# Patient Record
Sex: MALE | Race: WHITE | URBAN - METROPOLITAN AREA
[De-identification: names, ages, dates, MRNs, and addresses within clinical notes are randomized per-mention and may not be internally consistent; named-entity substitution may affect disease eponyms.]

---

## 2017-01-05 ENCOUNTER — VIRTUAL VISIT (OUTPATIENT)
Dept: NURSING | Facility: CLINIC | Age: 57
End: 2017-01-05

## 2017-01-05 DIAGNOSIS — E66.01 MORBID OBESITY DUE TO EXCESS CALORIES (H): ICD-10-CM

## 2017-01-05 DIAGNOSIS — E11.59 TYPE 2 DIABETES MELLITUS WITH OTHER CIRCULATORY COMPLICATIONS (H): Primary | ICD-10-CM

## 2017-01-05 PROCEDURE — 99207 ZZC HEALTH COACHING, NO CHARGE: CPT

## 2017-01-05 NOTE — PROGRESS NOTES
January 5, 2017    [facility]  [address] [facility] [address]  [phone]  Health Coaching Progress Note    Patient Name: Hill Ardon Date: January 5, 2017      Session Length: 20      DATA    PRM Master Survey Scores Reviewed: Yes    Core Healthy Days Survey:         JEFE Score (Last Two) 7/14/2016 7/25/2016   JEFE Raw Score 41 38   Activation Score 63.2 52.9   JEFE Level 3 2       PHQ-2 Score 8/24/2016 8/9/2016   PHQ-2 Total Score Interpretation - Positive if 3 or more points; Administer PHQ-9 if positive 0 0       No flowsheet data found.    Treatment Objective(s) Addressed in This Session:  Target Behavior(s): diet/weight loss and smoking    Current Stressors / Issues:  Geoffrey states that he is freezing as he recently returned from [place]. Geoffrey goes on to say he had some things he had to come back a bit early to handle but that things are going okay now, not great but okay.    What Patient Does Well:   Geoffrey shares today that he is still smoke free and doesn't even think seriously about going back. First and foremost it is completely unaffordable in [place]. Geoffrey shares that it also helps a lot that his girlfriend also quit smoking about 2 weeks after he did.    Previous Successes:   Geoffrey states that he has greatly reduced his sweets intake and expects his A1c to reflect this. Geoffrey states that he may come into clinic to do his A1c tomorrow, if not it will likely be in the next week.    Areas in Need of Improvement:   Geoffrey reports no additional areas in need of improvement that he isn't already working on.     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 Geoffrey notes that he is taking his insulin and his HBP medication regularly though.    Reasons for Change:   Geoffrey shares that his health is important to him and  that he wants to be able to work and also enjoy life.    Plan/Goal for the Next 4 Weeks:     GOAL #1: Continue tobacco cessation  GOAL #1 Progress Toward Goal: 100%  GOAL #2: Continue walking every other day  GOAL #2 Progress Toward Goal: 100%  GOAL #3: Continue decreasing sweets  GOAL #3 Progress Toward Goal: 100%    Intervention:  Motivational Interviewing    MI Intervention: Expressed Empathy/Understanding, Supported Autonomy, Collaboration, Evocation, Permission to raise concern or advise, Open-ended questions, Reflections: simple and complex, Change talk (evoked) and Reframe     Change Talk Expressed by the Patient: Desire to change Ability to change Reasons to change Committment to change Activation Taking steps    Provider Response to Change Talk: E - Evoked more info from patient about behavior change, A - Affirmed patient's thoughts, decisions, or attempts at behavior change, R - Reflected patient's change talk and S - Summarized patient's change talk statements    Assessment / Progress on Treatment Objective(s) / Homework:    Satisfactory progress - ACTION (Actively working towards change); Intervened by reinforcing change plan / affirming steps taken         Plan: (Homework, other):  Patient was encouraged to continue to seek condition-related information and education, as well as schedule a follow up appointment with the Health  in 4 weeks. Patient has set self-identified goals and will monitor progress until the next appointment.  Next visit scheduled for February 2 at 11:30AM.      Sam Elam

## 2017-01-25 ENCOUNTER — ALLIED HEALTH/NURSE VISIT (OUTPATIENT)
Dept: PHARMACY | Facility: CLINIC | Age: 57
End: 2017-01-25
Payer: COMMERCIAL

## 2017-01-25 ENCOUNTER — TELEPHONE (OUTPATIENT)
Dept: FAMILY MEDICINE | Facility: CLINIC | Age: 57
End: 2017-01-25

## 2017-01-25 DIAGNOSIS — I25.10 CORONARY ARTERY DISEASE INVOLVING NATIVE HEART WITHOUT ANGINA PECTORIS, UNSPECIFIED VESSEL OR LESION TYPE: ICD-10-CM

## 2017-01-25 DIAGNOSIS — K21.00 GASTROESOPHAGEAL REFLUX DISEASE WITH ESOPHAGITIS: ICD-10-CM

## 2017-01-25 DIAGNOSIS — I10 ESSENTIAL HYPERTENSION WITH GOAL BLOOD PRESSURE LESS THAN 140/90: ICD-10-CM

## 2017-01-25 DIAGNOSIS — B18.2 CHRONIC HEPATITIS C WITHOUT HEPATIC COMA (H): ICD-10-CM

## 2017-01-25 DIAGNOSIS — E11.59 TYPE 2 DIABETES MELLITUS WITH OTHER CIRCULATORY COMPLICATIONS (H): ICD-10-CM

## 2017-01-25 DIAGNOSIS — E78.5 HYPERLIPIDEMIA LDL GOAL <100: ICD-10-CM

## 2017-01-25 DIAGNOSIS — E11.59 TYPE 2 DIABETES MELLITUS WITH OTHER CIRCULATORY COMPLICATIONS (H): Primary | ICD-10-CM

## 2017-01-25 DIAGNOSIS — G47.00 INSOMNIA, UNSPECIFIED TYPE: ICD-10-CM

## 2017-01-25 DIAGNOSIS — F17.200 CURRENT SMOKER: Primary | ICD-10-CM

## 2017-01-25 PROCEDURE — 99607 MTMS BY PHARM ADDL 15 MIN: CPT | Performed by: PHARMACIST

## 2017-01-25 PROCEDURE — 99605 MTMS BY PHARM NP 15 MIN: CPT | Performed by: PHARMACIST

## 2017-01-25 NOTE — Clinical Note
Dr. Ding,  I saw your patient for a med review before his Hep C therapy start. Pt has been unable to afford his medications until recently, he stopped many of them, but should be restarting them today. I will follow up with him in 2 weeks to make sure he is doing okay.   See note for details  Jose Healy, PharmD [facility] Pharmacist  Phone: [phone]

## 2017-01-25 NOTE — PROGRESS NOTES
SUBJECTIVE/OBJECTIVE:                                                    Hill Ardon is a 56 year old male called for an initial visit for Medication Therapy Management.  He was referred to me from MARY Witt.     Chief Complaint: He is concerned he is taking Omeprazole.     Allergies/ADRs: None  Tobacco: 0-1 pack per day - is interested in quitting Tobacco Cessation Action Plan: Pharmacotherapies : other Nicotine replacement and cold turkey  Alcohol: history of alcohol dependence  Caffeine: 8-10 cups/day of coffee  Activity: not much  PMH: Reviewed in Epic    Medication Adherence: Pt has not been able to afford medications. He hit his out of pocket deductible with Harvoni He has ordered the rest of his Medications.  He is currently taking: Aspirin 81mg daily, Omeprazole 40mg daily, Atorvastatin, Novolog,Duoneb, Lisinopril  Ordered: Lantus, Clopidogrel, Carvedilol.    HCV:   -[facility] Use Only -   Genotype: 1a  Viral Load and date drawn: 18 million on 7/15/16  Previous treatment: Treatment naive  Liver staging: F1-2 on 9/26/16  Child Mosher score: N/A  HIV positive: No  Renal impairment CrCl <30mL/min: No  Decompensated Cirrhosis: No  Recurrent HCV post-liver transplant: No  Hepatocellular Carcinoma: No   Initial Regimen: Harvoni (ledipasvir/sofosbuvir)  Length of Therapy Ordered: 12 weeks  DDIx with HCV therapy: Omeprazole, Atorvastatin, Carvedilol.  MTM Evaluation: HCV therapy appropriate and Length of therapy appropriate    GERD: Current medications include: Prilosec (omeprazole) 40mg daily. Pt c/o heartburn after eating spicy and acidic foods.  Patient feels that current regimen is effective. He has taken a week off PPI before without issue, if he avoids certain foods.     Diabetes:  Pt currently taking Novolog 14 units only twice daily, has not been taking his Lantus due to cost, but now can afford it due to meeting his deductable. He is taking Lantus 30 units qHS when he has some.  Denies Side effects  SMBG: three times daily.   Ranges (patient reported): States he is running around <200.   Symptoms of low blood sugar? none. Frequency of hypoglycemia? never.  Recent symptoms of high blood sugar? polydipsia and polyuria  Aspirin: Taking 81mg daily and denies side effects  Diet/Exercise: pt states he was doing great on diabetic diet, but has fallen off the wagon so to speak.     Hx of Stent in 2015: Pt is taking Aspirin 81mg daily,he has not been taking Clopidogrel. His cardiologist is at [facility], Dr. Rojas. Denies issues    Hypertension: He is not taking Carvedilol 6.25mg BID, but has ordered it today. He is taking Lisinopril 40mg daily. Patient does not self-monitor BP, last in clinic was 112/77.  Patient reports no current medication side effects.    Insomnia: Current medications include: trazodone 100mg qHS. Pt reports not taking due to cost, but has ordered since he met his deductible.    Smoking Cessation:  How much does he smoke:  10 cigarettes. He recently quit for 6 weeks, but started after dropping to 14mg.  His girlfriend quit smoking, which motivated him to quit as well. He is interested in setting another quit date. He thinks he would do better quitting cold turkey.      Hyperlipidemia: Current therapy includes Atorvastatin 40mg daily, Fenofibrate 160mg daily.  Pt reports no significant myalgias or other side effects.   The 10-year ASCVD risk score (Anne DESIREE Jr., et al., 2013) is: 18%    Values used to calculate the score:      Age: 56 years      Sex: Male      Is an : No      Diabetic: Yes      Tobacco smoker: No      Systolic Blood Pressure: 116 mmHg      Prescribed Antihypertensives: Yes      HDL Cholesterol: 42 mg/dL      Total Cholesterol: 239 mg/dL    Current labs include:  BP Readings from Last 3 Encounters:   10/19/16 116/63   09/07/16 146/95   08/24/16 112/69     Today's Vitals: There were no vitals taken for this visit.  A1C      9.9   8/24/2016.  CHOL       239   7/15/2016  TRIG      341   7/15/2016  HDL       42   7/15/2016  LDL      129   7/15/2016    Liver Function Studies -   Recent Labs   Lab Test  08/24/16   0923   PROTTOTAL  7.4   ALBUMIN  3.6   BILITOTAL  0.4   ALKPHOS  65   AST  42   ALT  93*       Lab Results   Component Value Date    UCRR 76 07/14/2016    MICROL 8 07/14/2016    UMALCR 10.58 07/14/2016       Last Basic Metabolic Panel:  NA      134   7/15/2016   POTASSIUM      3.9   7/15/2016  CHLORIDE       97   7/15/2016  BUN       16   7/15/2016  CR     0.98   7/15/2016  GFR ESTIMATE   Date Value Ref Range Status   07/15/2016 79 >60 mL/min/1.7m2 Final     Comment:     Non  GFR Calc       TSH   Date Value Ref Range Status   07/15/2016 3.22 0.40 - 4.00 mU/L Final   ]    Most Recent Immunizations   Administered Date(s) Administered     Pneumococcal 23 valent 06/30/2015     TD (ADULT, 7+) 04/22/2007     ASSESSMENT:                                                       Current medications were reviewed today.     Medication Adherence: Needs improvement. Pt has recently met deductible and will be restarting his medications soon. I recommended he start ASAP. He was unable to afford many of them before.     HCV: Therapy is appropriate. Harvoni daily in the AM for 12 weeks.   DDIx:   -Omeprazole- Pt agreed to D/C Omeprazole before starting Harvoni, he has gone off of it before without issue. He is to call me if he has any rebound heartburn.   -Atorvastatin- Moderate interaction, Harvoni may increase Atorvastatin levels, pt to monitor for myalgias.   -Carvedilol- Moderate interaction. Pt to monitor for exercise fatigue, weakness, dizziness. Harvoni may increase Carvedilol levels.     GERD: Needs improvement. Pt to hold Omeprazole while on Harvoni. We discussed the negative long term effects of PPI treatment, and patient may want to manage heartburn using other methods. Pt's Mother has hx of osteoporosis, and chronic PPI use can increase fracture  risk.     Diabetes: Needs improvement. Pt's BG is not at goal. Last A1c was 9.9%. Pt will be restarting Lantus ASAP. I recommend patient record his BG values in a journal. We will discuss at next visit.     Hx of Stent in 2015: Needs improvement. Pt is 2 years post stent, he is not indicated for continuing Clopidogrel use. We will discuss this at next visit.     Hypertension: Stable. Last BP at goal, pt restarting Carvedilol, see HCV assessment for information on DDIx with Harvoni, not major.     Insomnia: Stable.     Smoking Cessation: Needs improvement. Pt set quit date for 2/4, will discuss at his FU on 2/8. He wanted to try cold turkey, but I encouraged him to use Nicotine gum q 1-2 hours as needed for cravings as well.      Hyperlipidemia: Stable. Pt to monitor for increased myalgias while on Harvoni.     PLAN:                                                      Pt to...  1. Your quit date is set for 2/4. Use your Nicotine gum every 1-2 hours as needed for cravings during this time. The night before remember to throw out any smoking paraphernalia     2. Hold Omeprazole while on Harvoni. Sometimes quitting this medication cold turkey can cause rebound heartburn. If you have this issue, please let me know and we will work out a plan to control your heartburn. For now avoid your food triggers.     3. Monitor for thigh muscle weakness or achiness. This could be caused by a drug interaction between Atorvastatin and Harvoni. Let me know if this occurs.     4. Monitor for dizziness, lightheadedness, or excess fatigue. This could be caused by a drug interaction between Carvedilol and Harvoni. Let me know if this occurs.     5. Record your blood sugars and blood pressure in a journal between now and 2/8, our next visit. We will go over these specific numbers at our next visit.    6.  you medications from the pharmacy ASAP!    I spent 45 minutes with this patient today.  All changes were made via collaborative  practice agreement with Fariba Ding. A copy of the visit note was provided to the patient's primary care provider.    Will follow up in 2 weeks.    The patient was sent via [employer] a summary of these recommendations as an after visit summary.     Jose Healy PharmD  [facility] Pharmacist    Phone: [phone]

## 2017-01-25 NOTE — TELEPHONE ENCOUNTER
carvedilol (COREG) 6.25 MG tablet      Last Written Prescription Date: 7/14/16  Last Fill Quantity: 180, # refills: 0    Last Office Visit with [facility], [facility] or [facility] prescribing provider:  8/24/16   Future Office Visit:    Next 5 appointments (look out 90 days)     Feb 02, 2017 11:30 AM   [facility] [address] [facility] [facility] [address] [phone]                    BP Readings from Last 3 Encounters:   10/19/16 116/63   09/07/16 146/95   08/24/16 112/69     insulin glargine (LANTUS SOLOSTAR) 100 UNIT/ML PEN         Last Written Prescription Date: 7/14/16  Last Fill Quantity: 3, # refills: 0  Last Office Visit with [facility], [facility] or  Health prescribing provider:  8/24/16   Next 5 appointments (look out 90 days)     Feb 02, 2017 11:30 AM   Telephone Visit with [address] [address] [phone]                   BP Readings from Last 3 Encounters:   10/19/16 116/63   09/07/16 146/95   08/24/16 112/69     MICROL        8   7/14/2016  No results found for this basename: microalbumin  CREATININE   Date Value Ref Range Status   07/15/2016 0.98 0.66 - 1.25 mg/dL Final   ]  GFR ESTIMATE   Date Value Ref Range Status   07/15/2016 79 >60 mL/min/1.7m2 Final     Comment:     Non  GFR Calc     GFR ESTIMATE IF BLACK   Date Value Ref Range Status   07/15/2016 >90   GFR Calc   >60 mL/min/1.7m2 Final     CHOL      239   7/15/2016  HDL       42   7/15/2016  LDL      129   7/15/2016  TRIG      341   7/15/2016  No results found for this basename: cholhdlratio  AST       42   8/24/2016  ALT       93   8/24/2016  A1C      9.9   8/24/2016  A1C      9.9   7/15/2016  POTASSIUM   Date Value Ref Range Status   07/15/2016 3.9 3.4 - 5.3 mmol/L Final       lisinopril (PRINIVIL,ZESTRIL) 40 MG tablet     Last Written  Prescription Date: 7/14/16  Last Fill Quantity: 90, # refills: 0  Last Office Visit with [facility], [facility] or [facility] prescribing provider: 8/24/16   Next 5 appointments (look out 90 days)     Feb 02, 2017 11:30 AM   Telephone Visit with [address] ([facility])    [address] [facility] [address] [facility]  [place] [address]   [phone]                   POTASSIUM   Date Value Ref Range Status   07/15/2016 3.9 3.4 - 5.3 mmol/L Final     CREATININE   Date Value Ref Range Status   07/15/2016 0.98 0.66 - 1.25 mg/dL Final     BP Readings from Last 3 Encounters:   10/19/16 116/63   09/07/16 146/95   08/24/16 112/69     NOVOLOG FLEXPEN 100 UNIT/ML soln        Last Written Prescription Date: 10/1/16  Last Fill Quantity: 15mL, # refills: 0  Last Office Visit with [facility], [facility] or [facility] prescribing provider:  8/24/16   Next 5 appointments (look out 90 days)     Feb 02, 2017 11:30 AM   Telephone Visit with [address] ([facility])    [address] [facility] [address] [phone]                   BP Readings from Last 3 Encounters:   10/19/16 116/63   09/07/16 146/95   08/24/16 112/69     MICROL        8   7/14/2016  No results found for this basename: microalbumin  CREATININE   Date Value Ref Range Status   07/15/2016 0.98 0.66 - 1.25 mg/dL Final   ]  GFR ESTIMATE   Date Value Ref Range Status   07/15/2016 79 >60 mL/min/1.7m2 Final     Comment:     Non  GFR Calc     GFR ESTIMATE IF BLACK   Date Value Ref Range Status   07/15/2016 >90   GFR Calc   >60 mL/min/1.7m2 Final     CHOL      239   7/15/2016  HDL       42   7/15/2016  LDL      129   7/15/2016  TRIG      341   7/15/2016  No results found for this basename: cholhdlratio  AST       42   8/24/2016  ALT       93   8/24/2016  A1C      9.9   8/24/2016  A1C      9.9   7/15/2016  POTASSIUM   Date Value Ref Range Status   07/15/2016 3.9 3.4 - 5.3  mmol/L Final     atorvastatin (LIPITOR) 40 MG tablet     Last Written Prescription Date: 7/14/16  Last Fill Quantity: 30, # refills: 1  Last Office Visit with [facility], [facility] or  [facility] prescribing provider: 8/24/16   Next 5 appointments (look out 90 days)     Feb 02, 2017 11:30 AM   Telephone Visit with HEALTH  - [facility]   [facility] ([facility]    [address] [facility] [address]   [phone]                   CHOL      239   7/15/2016  HDL       42   7/15/2016  LDL      129   7/15/2016  TRIG      341   7/15/2016  No results found for this basename: cholhdlratio

## 2017-01-27 NOTE — TELEPHONE ENCOUNTER
Routing refill request to provider for review/approval because:  Marisol given x1 and patient did not follow up, please advise  Overdue for f/u    Pete Joyce RN

## 2017-01-27 NOTE — TELEPHONE ENCOUNTER
TC - please help with scheduling and route it back to the RN's pool.    Valente SELLERS, RN, BSN

## 2017-01-30 ENCOUNTER — TELEPHONE (OUTPATIENT)
Dept: FAMILY MEDICINE | Facility: CLINIC | Age: 57
End: 2017-01-30

## 2017-01-30 DIAGNOSIS — Z79.4 TYPE 2 DIABETES MELLITUS WITH HYPERGLYCEMIA, WITH LONG-TERM CURRENT USE OF INSULIN (H): Primary | ICD-10-CM

## 2017-01-30 DIAGNOSIS — E11.65 TYPE 2 DIABETES MELLITUS WITH HYPERGLYCEMIA, WITH LONG-TERM CURRENT USE OF INSULIN (H): Primary | ICD-10-CM

## 2017-01-30 RX ORDER — CARVEDILOL 6.25 MG/1
TABLET ORAL
Qty: 180 TABLET | Refills: 0 | Status: SHIPPED | OUTPATIENT
Start: 2017-01-30 | End: 2017-01-31

## 2017-01-30 RX ORDER — INSULIN ASPART 100 [IU]/ML
INJECTION, SOLUTION INTRAVENOUS; SUBCUTANEOUS
Qty: 15 ML | Refills: 0 | Status: SHIPPED | OUTPATIENT
Start: 2017-01-30 | End: 2017-01-31

## 2017-01-30 RX ORDER — INSULIN GLARGINE 100 [IU]/ML
INJECTION, SOLUTION SUBCUTANEOUS
Qty: 10 ML | Refills: 0 | Status: SHIPPED | OUTPATIENT
Start: 2017-01-30 | End: 2017-01-31

## 2017-01-30 RX ORDER — ATORVASTATIN CALCIUM 40 MG/1
TABLET, FILM COATED ORAL
Qty: 90 TABLET | Refills: 0 | Status: SHIPPED | OUTPATIENT
Start: 2017-01-30 | End: 2017-01-31

## 2017-01-30 RX ORDER — LISINOPRIL 40 MG/1
TABLET ORAL
Qty: 90 TABLET | Refills: 0 | Status: SHIPPED | OUTPATIENT
Start: 2017-01-30 | End: 2017-01-31

## 2017-01-30 NOTE — TELEPHONE ENCOUNTER
Pt is currently taking Harvoni, this can increase the levels of carvedilol and atorvastatin. I did tell him to restart both medications, he has been off his medications for some time due to finances. Therapy should be monitored. He has an appt. 1-31-17  Thank you  Tiffanie De Leon, [facility]  Phone [phone]  Fax      [phone]

## 2017-01-30 NOTE — TELEPHONE ENCOUNTER
Next 5 appointments (look out 90 days)     Jan 31, 2017  2:20 PM   [facility]iba [facility]ing MD   [facility]    [address]  [place] [address]   [phone]            Feb 02, 2017 11:30 AM   Telephone Visit with HEALTH  - [facility] 2   [facility]    [address]  [place] [address]   [phone]              Cristina Black,     Called Patient is scheduled to be seen and is out of medications.

## 2017-01-31 ENCOUNTER — OFFICE VISIT (OUTPATIENT)
Dept: FAMILY MEDICINE | Facility: CLINIC | Age: 57
End: 2017-01-31
Payer: COMMERCIAL

## 2017-01-31 VITALS
SYSTOLIC BLOOD PRESSURE: 104 MMHG | BODY MASS INDEX: 36.5 KG/M2 | WEIGHT: 240 LBS | TEMPERATURE: 97.9 F | HEART RATE: 94 BPM | OXYGEN SATURATION: 95 % | DIASTOLIC BLOOD PRESSURE: 76 MMHG | RESPIRATION RATE: 14 BRPM

## 2017-01-31 DIAGNOSIS — Z23 ENCOUNTER FOR IMMUNIZATION: ICD-10-CM

## 2017-01-31 DIAGNOSIS — E11.42 DIABETIC POLYNEUROPATHY ASSOCIATED WITH TYPE 2 DIABETES MELLITUS (H): ICD-10-CM

## 2017-01-31 DIAGNOSIS — Z79.4 TYPE 2 DIABETES MELLITUS WITH OTHER CIRCULATORY COMPLICATION, WITH LONG-TERM CURRENT USE OF INSULIN (H): Primary | ICD-10-CM

## 2017-01-31 DIAGNOSIS — I25.10 CORONARY ARTERY DISEASE INVOLVING NATIVE HEART WITHOUT ANGINA PECTORIS, UNSPECIFIED VESSEL OR LESION TYPE: ICD-10-CM

## 2017-01-31 DIAGNOSIS — E78.5 HYPERLIPIDEMIA LDL GOAL <100: ICD-10-CM

## 2017-01-31 DIAGNOSIS — B18.2 CHRONIC HEPATITIS C WITHOUT HEPATIC COMA (H): ICD-10-CM

## 2017-01-31 DIAGNOSIS — I42.9 CARDIOMYOPATHY (H): ICD-10-CM

## 2017-01-31 DIAGNOSIS — I10 ESSENTIAL HYPERTENSION WITH GOAL BLOOD PRESSURE LESS THAN 140/90: ICD-10-CM

## 2017-01-31 DIAGNOSIS — E66.01 MORBID OBESITY DUE TO EXCESS CALORIES (H): ICD-10-CM

## 2017-01-31 DIAGNOSIS — E11.59 TYPE 2 DIABETES MELLITUS WITH OTHER CIRCULATORY COMPLICATION, WITH LONG-TERM CURRENT USE OF INSULIN (H): Primary | ICD-10-CM

## 2017-01-31 DIAGNOSIS — Z23 NEED FOR PROPHYLACTIC VACCINATION AND INOCULATION AGAINST INFLUENZA: ICD-10-CM

## 2017-01-31 LAB — HBA1C MFR BLD: 11.8 % (ref 4.3–6)

## 2017-01-31 PROCEDURE — 99214 OFFICE O/P EST MOD 30 MIN: CPT | Mod: 25 | Performed by: FAMILY MEDICINE

## 2017-01-31 PROCEDURE — 90715 TDAP VACCINE 7 YRS/> IM: CPT | Performed by: FAMILY MEDICINE

## 2017-01-31 PROCEDURE — G0008 ADMIN INFLUENZA VIRUS VAC: HCPCS | Mod: 59 | Performed by: FAMILY MEDICINE

## 2017-01-31 PROCEDURE — 90471 IMMUNIZATION ADMIN: CPT | Mod: 59 | Performed by: FAMILY MEDICINE

## 2017-01-31 PROCEDURE — 80061 LIPID PANEL: CPT | Performed by: FAMILY MEDICINE

## 2017-01-31 PROCEDURE — 80076 HEPATIC FUNCTION PANEL: CPT | Performed by: FAMILY MEDICINE

## 2017-01-31 PROCEDURE — 80048 BASIC METABOLIC PNL TOTAL CA: CPT | Performed by: FAMILY MEDICINE

## 2017-01-31 PROCEDURE — 83036 HEMOGLOBIN GLYCOSYLATED A1C: CPT | Performed by: FAMILY MEDICINE

## 2017-01-31 PROCEDURE — 90686 IIV4 VACC NO PRSV 0.5 ML IM: CPT | Performed by: FAMILY MEDICINE

## 2017-01-31 RX ORDER — ATORVASTATIN CALCIUM 40 MG/1
40 TABLET, FILM COATED ORAL DAILY
Qty: 90 TABLET | Refills: 0 | Status: SHIPPED | OUTPATIENT
Start: 2017-01-31 | End: 2017-04-07

## 2017-01-31 RX ORDER — LEDIPASVIR AND SOFOSBUVIR 90; 400 MG/1; MG/1
TABLET, FILM COATED ORAL
COMMUNITY
Start: 2017-01-23

## 2017-01-31 RX ORDER — LISINOPRIL 40 MG/1
40 TABLET ORAL DAILY
Qty: 90 TABLET | Refills: 3 | Status: SHIPPED | OUTPATIENT
Start: 2017-01-31

## 2017-01-31 RX ORDER — CARVEDILOL 6.25 MG/1
6.25 TABLET ORAL 2 TIMES DAILY
Qty: 180 TABLET | Refills: 3 | Status: SHIPPED | OUTPATIENT
Start: 2017-01-31 | End: 2017-02-23

## 2017-01-31 NOTE — PROGRESS NOTES
Injectable Influenza Immunization Documentation    1.  Is the person to be vaccinated sick today?  No    2. Does the person to be vaccinated have an allergy to eggs or to a component of the vaccine?  No    3. Has the person to be vaccinated today ever had a serious reaction to influenza vaccine in the past?  No    4. Has the person to be vaccinated ever had Guillain-[place] syndrome?  No     Form completed by patient    Alayna Vital MA

## 2017-01-31 NOTE — NURSING NOTE
"Chief Complaint   Patient presents with     Diabetes       Initial /76 mmHg  Pulse 94  Temp(Src) 97.9  F (36.6  C)  Resp 14  Wt 240 lb (108.863 kg)  SpO2 95% Estimated body mass index is 36.5 kg/(m^2) as calculated from the following:    Height as of 10/19/16: 5' 7.99\" (1.727 m).    Weight as of this encounter: 240 lb (108.863 kg).  BP completed using cuff size: large LEFT ARM    Marina Elliott. MA      "

## 2017-01-31 NOTE — TELEPHONE ENCOUNTER
Insulin pen needles      Last Written Prescription Date:  Not listed  Last Fill Quantity: 0,   # refills: 0  Last Office Visit with [facility], [facility] or  [facility] prescribing provider: 10/19/2016  Future Office visit:    Next 5 appointments (look out 90 days)     Jan 31, 2017  2:20 PM   Office Visit with Fariba Ding MD   [facility] ([facility]    [address]  [place] [address]   [phone]            Feb 02, 2017 11:30 AM   Telephone Visit with HEALTH  - [facility]   [facility] ([facility])    [address] [facility] [address]   [phone]                   Routing refill request to provider for review/approval because:  Drug not active on patient's medication list

## 2017-01-31 NOTE — TELEPHONE ENCOUNTER
May we get an rx for his pen needles to  Use with his insulin. He uses about 4 per day  Thank you  Tiffanie De Leon, [facility]  Phone [phone]  Fax      [phone]

## 2017-01-31 NOTE — PATIENT INSTRUCTIONS
See Our Pharmacist in 2 weeks  And see Dr Ding in 2 months  Fariba Ding MD    [facility]    If you have any questions regarding to your visit please contact your care team:       Team Red:   Clinic Hours Telephone Number   Dr. Geena Pemberton  (pediatrics)  Kelley Butler NP 7am-7pm  Monday - Thursday   7am-5pm  Fridays  [phone] (fax)    Valente LR  [phone]   Urgent Care - [place] and [place] Monday-Friday  [place] - 11am-8pm  Saturday-Sunday  Both sites - 9am-5pm  [phone] - [facility]  [phone] - [place]       What options do I have for visits at the clinic other than the traditional office visit?  To expand how we care for you, many of our providers are utilizing electronic visits (e-visits) and telephone visits, when medically appropriate, for interactions with their patients rather than a visit in the clinic.   We also offer nurse visits for many medical concerns. Just like any other service, we will bill your insurance company for this type of visit based on time spent on the phone with your provider. Not all insurance companies cover these visits. Please check with your medical insurance if this type of visit is covered. You will be responsible for any charges that are not paid by your insurance.      E-visits via [employer]:  generally incur a $35.00 fee.  Telephone visits:  Time spent on the phone: *charged based on time that is spent on the phone in increments of 10 minutes. Estimated cost:   5-10 mins $30.00   11-20 mins. $59.00   21-30 mins. $85.00     As always, Thank you for trusting us with your health care needs!        Discharged by Alayna Vital MA.

## 2017-01-31 NOTE — Clinical Note
[facility]  [address] [place] [address]. [place] [address]    February 1, 2017    Hill Ardon  [address]  [facility] [address]          Dear Hill,    Please take medicines as recommended   Follow up 6 weeks  Your cholesterol and sugars are High as discussed     Enclosed is a copy of your results.     Results for orders placed or performed in visit on 01/31/17   BASIC METABOLIC PANEL   Result Value Ref Range    Sodium 139 133 - 144 mmol/L    Potassium 3.9 3.4 - 5.3 mmol/L    Chloride 103 94 - 109 mmol/L    Carbon Dioxide 25 20 - 32 mmol/L    Anion Gap 11 3 - 14 mmol/L    Glucose 133 (H) 70 - 99 mg/dL    Urea Nitrogen 14 7 - 30 mg/dL    Creatinine 0.80 0.66 - 1.25 mg/dL    GFR Estimate >90  Non  GFR Calc   >60 mL/min/1.7m2    GFR Estimate If Black >90   GFR Calc   >60 mL/min/1.7m2    Calcium 8.9 8.5 - 10.1 mg/dL   HEPATIC PANEL   Result Value Ref Range    Bilirubin Direct 0.1 0.0 - 0.2 mg/dL    Bilirubin Total 0.6 0.2 - 1.3 mg/dL    Albumin 3.4 3.4 - 5.0 g/dL    Protein Total 7.0 6.8 - 8.8 g/dL    Alkaline Phosphatase 77 40 - 150 U/L    ALT 48 0 - 70 U/L    AST 22 0 - 45 U/L   HEMOGLOBIN A1C   Result Value Ref Range    Hemoglobin A1C 11.8 (H) 4.3 - 6.0 %   Lipid panel reflex to direct LDL   Result Value Ref Range    Cholesterol 235 (H) <200 mg/dL    Triglycerides 343 (H) <150 mg/dL    HDL Cholesterol 37 (L) >39 mg/dL    LDL Cholesterol Calculated 129 (H) <100 mg/dL    Non HDL Cholesterol 198 (H) <130 mg/dL       If you have any questions or concerns, please call myself or my nurse at [phone].      Sincerely,        Fariba Ding MD/MEAGAN

## 2017-01-31 NOTE — TELEPHONE ENCOUNTER
Patient is in the clinic right now seen by Dr. Ding.  Medication has been refilled by Dr. Ding.    Valente SELLERS RN, BSN

## 2017-01-31 NOTE — MR AVS SNAPSHOT
After Visit Summary   1/31/2017    Hill Ardon    MRN: [id number]           Patient Information     Date Of Birth          1960        Visit Information        Provider Department      1/31/2017 2:20 PM Fariba Ding MD [facility]        Today's Diagnoses     Type 2 diabetes mellitus with other circulatory complications (H)    -  1     Morbid obesity due to excess calories (H)         Essential hypertension with goal blood pressure less than 140/90         Cardiomyopathy (H)         Coronary artery disease involving native heart without angina pectoris, unspecified vessel or lesion type         Chronic hepatitis C without hepatic coma (H)         Diabetic polyneuropathy associated with type 2 diabetes mellitus (H)         Hyperlipidemia LDL goal <100         Need for prophylactic vaccination and inoculation against influenza         Type 2 diabetes mellitus with hyperglycemia, with long-term current use of insulin (H)         Encounter for immunization           Care Instructions    See Our Pharmacist in 2 weeks  And see Dr Ding in 2 months  Fariba Ding MD    [facility]    If you have any questions regarding to your visit please contact your care team:       Team Red:   Clinic Hours Telephone Number   Dr. Geena Pemberton  (pediatrics)  Kelley Butler NP 7am-7pm  Monday - Thursday   7am-5pm  Fridays  [phone] (fax)    [facility]  [phone]   Urgent Care - [place] [facility] [place] Monday-Friday  [place] - 11am-8pm  Saturday-Sunday  Both sites - 9am-5pm  [phone] - [facility]  [phone] [facility]       What options do I have for visits at the clinic other than the traditional office visit?  To expand how we care for you, many of our providers are utilizing electronic visits (e-visits) and telephone visits, when medically appropriate, for interactions with their patients rather than a visit in  the clinic.   We also offer nurse visits for many medical concerns. Just like any other service, we will bill your insurance company for this type of visit based on time spent on the phone with your provider. Not all insurance companies cover these visits. Please check with your medical insurance if this type of visit is covered. You will be responsible for any charges that are not paid by your insurance.      E-visits via [employer]:  generally incur a $35.00 fee.  Telephone visits:  Time spent on the phone: *charged based on time that is spent on the phone in increments of 10 minutes. Estimated cost:   5-10 mins $30.00   11-20 mins. $59.00   21-30 mins. $85.00     As always, Thank you for trusting us with your health care needs!        Discharged by Alayna Vital MA.                  Follow-ups after your visit        Your next 10 appointments already scheduled     Feb 02, 2017 11:30 AM   Telephone Visit with HEALTH  - [facility]   [facility] ([facility] [address] [phone]           Note: this is not an onsite visit; there is no need to come to the facility.            Feb 08, 2017  2:30 PM   TELEMEDICINE with Jose Healy [facility] ([facility])    [address] [place] [address]  3rd [facility] [address]              Note: this is not an onsite visit; there is no need to come to the facility.              Who to contact     If you have questions or need follow up information about today's clinic visit or your schedule please contact [facility] directly at [phone].  Normal or non-critical lab and imaging results will be communicated to you by MyChart, letter or phone within 4 business days after the clinic has received the results. If you do not hear from us within 7 days, please contact the clinic through MyChart or phone. If you have a critical or  abnormal lab result, we will notify you by phone as soon as possible.  Submit refill requests through [employer] or call your pharmacy and they will forward the refill request to us. Please allow 3 business days for your refill to be completed.          Additional Information About Your Visit        [employer] Information     [employer] gives you secure access to your electronic health record. If you see a primary care provider, you can also send messages to your care team and make appointments. If you have questions, please call your primary care clinic.  If you do not have a primary care provider, please call [phone] and they will assist you.        Care EveryWhere ID     This is your Care EveryWhere ID. This could be used by other organizations to access your [facility] medical records  [id number]        Your Vitals Were     Pulse Temperature Respirations Pulse Oximetry          94 97.9  F (36.6  C) 14 95%         Blood Pressure from Last 3 Encounters:   01/31/17 104/76   10/19/16 116/63   09/07/16 146/95    Weight from Last 3 Encounters:   01/31/17 240 lb (108.863 kg)   10/19/16 246 lb (111.585 kg)   09/07/16 249 lb 4.8 oz (113.082 kg)              We Performed the Following     BASIC METABOLIC PANEL     HEART FAILURE ACTION PLAN ORDER     HEMOGLOBIN A1C     HEPATIC PANEL     Lipid panel reflex to direct LDL     TDAP (ADACEL AGES 11-64)          Today's Medication Changes          These changes are accurate as of: 1/31/17  3:07 PM.  If you have any questions, ask your nurse or doctor.               These medicines have changed or have updated prescriptions.        Dose/Directions    * atorvastatin 40 MG tablet   Commonly known as:  LIPITOR   This may have changed:  Another medication with the same name was changed. Make sure you understand how and when to take each.   Used for:  Hyperlipidemia LDL goal <100   Changed by:  Fariba Ding MD        Dose:  40 mg   Take 1 tablet (40 mg) by mouth daily   Quantity:  30  tablet   Refills:  1       * atorvastatin 40 MG tablet   Commonly known as:  LIPITOR   This may have changed:  See the new instructions.   Used for:  Hyperlipidemia LDL goal <100   Changed by:  Fariba Ding MD        Dose:  40 mg   Take 1 tablet (40 mg) by mouth daily   Quantity:  90 tablet   Refills:  0       carvedilol 6.25 MG tablet   Commonly known as:  COREG   This may have changed:  See the new instructions.   Used for:  Essential hypertension with goal blood pressure less than 140/90   Changed by:  Fariba Ding MD        Dose:  6.25 mg   Take 1 tablet (6.25 mg) by mouth 2 times daily   Quantity:  180 tablet   Refills:  3       insulin aspart 100 UNIT/ML injection   Commonly known as:  NovoLOG FLEXPEN   This may have changed:  See the new instructions.   Used for:  Type 2 diabetes mellitus with other circulatory complications (H)   Changed by:  Fariba Ding MD        INJECT 10 UNITS SUBCUTANEOUSLY THREE TIMES DAILY WITH MEALS. (NEED TO BE SEEN IN CLINIC FOR FURTHER REFILLS) Refills at MD visit   Quantity:  15 mL   Refills:  0       insulin glargine 100 UNIT/ML injection   Commonly known as:  LANTUS SOLOSTAR   This may have changed:  See the new instructions.   Used for:  Type 2 diabetes mellitus with other circulatory complications (H)   Changed by:  Fariba Ding MD        INJECT 35 UNITS SUBCUTANEOUSLY AT BEDTIME   Quantity:  10 mL   Refills:  0       lisinopril 40 MG tablet   Commonly known as:  PRINIVIL/ZESTRIL   This may have changed:  See the new instructions.   Used for:  Essential hypertension with goal blood pressure less than 140/90   Changed by:  Fariba Ding MD        Dose:  40 mg   Take 1 tablet (40 mg) by mouth daily   Quantity:  90 tablet   Refills:  3       traZODone 100 MG tablet   Commonly known as:  DESYREL   This may have changed:    - when to take this  - reasons to take this  - additional instructions   Used for:  Insomnia, unspecified type        Dose:  100 mg   Take 1 tablet (100  mg) by mouth At Bedtime At night   Quantity:  90 tablet   Refills:  3       * Notice:  This list has 2 medication(s) that are the same as other medications prescribed for you. Read the directions carefully, and ask your doctor or other care provider to review them with you.         Where to get your medicines      These medications were sent to [facility] - [place] - [address]  [address], [place] [address]     Phone:  [phone]    - atorvastatin 40 MG tablet  - carvedilol 6.25 MG tablet  - insulin aspart 100 UNIT/ML injection  - insulin glargine 100 UNIT/ML injection  - insulin pen needle 31G X 6 MM  - lisinopril 40 MG tablet             Primary Care Provider Office Phone # Fax #    Fariba Ding [phone]       [facility] [address] [facility] [address]        Thank you!     Thank you for choosing [facility]  for your care. Our goal is always to provide you with excellent care. Hearing back from our patients is one way we can continue to improve our services. Please take a few minutes to complete the written survey that you may receive in the mail after your visit with us. Thank you!             Your Updated Medication List - Protect others around you: Learn how to safely use, store and throw away your medicines at www.disposemymeds.org.          This list is accurate as of: 1/31/17  3:07 PM.  Always use your most recent med list.                   Brand Name Dispense Instructions for use    albuterol 108 (90 BASE) MCG/ACT Inhaler    PROAIR HFA/PROVENTIL HFA/VENTOLIN HFA    3 Inhaler    Inhale 2 puffs into the lungs every 4 hours as needed for shortness of breath / dyspnea or wheezing q 4 hours as needed       aspirin 81 MG tablet      Take by mouth daily       * atorvastatin 40 MG tablet    LIPITOR    30 tablet    Take 1 tablet (40 mg) by mouth daily       * atorvastatin 40 MG tablet    LIPITOR    90  tablet    Take 1 tablet (40 mg) by mouth daily       blood glucose monitoring lancets      as needed       carvedilol 6.25 MG tablet    COREG    180 tablet    Take 1 tablet (6.25 mg) by mouth 2 times daily       clopidogrel 75 MG tablet    PLAVIX     Take 75 mg by mouth daily       fenofibrate 145 MG tablet      Take 145 mg by mouth daily       GENTLE STOOL SOFTENER PO      Take 200 mg by mouth daily       insulin aspart 100 UNIT/ML injection    NovoLOG FLEXPEN    15 mL    INJECT 10 UNITS SUBCUTANEOUSLY THREE TIMES DAILY WITH MEALS. (NEED TO BE SEEN IN CLINIC FOR FURTHER REFILLS) Refills at MD visit       insulin glargine 100 UNIT/ML injection    LANTUS SOLOSTAR    10 mL    INJECT 35 UNITS SUBCUTANEOUSLY AT BEDTIME       insulin pen needle 31G X 6 MM     100 each    Use 4 daily or as directed.       ipratropium - albuterol 0.5 mg/2.5 mg/3 mL 0.5-2.5 (3) MG/3ML neb solution    DUONEB    360 mL    Take 1 vial (3 mLs) by nebulization every 4 hours as needed for shortness of breath / dyspnea or wheezing       ledipasvir-sofosbuvir  MG per tablet   Generic drug:  ledipasvir-sofosbuvir          lisinopril 40 MG tablet    PRINIVIL/ZESTRIL    90 tablet    Take 1 tablet (40 mg) by mouth daily       nitroglycerin 0.4 MG sublingual tablet    NITROSTAT     Place 0.4 mg under the tongue as needed       omeprazole 40 MG capsule    priLOSEC    90 capsule    Take 1 capsule (40 mg) by mouth daily       traZODone 100 MG tablet    DESYREL    90 tablet    Take 1 tablet (100 mg) by mouth At Bedtime At night       * Notice:  This list has 2 medication(s) that are the same as other medications prescribed for you. Read the directions carefully, and ask your doctor or other care provider to review them with you.

## 2017-01-31 NOTE — PROGRESS NOTES
SUBJECTIVE:                                                    Hill Ardon is a 56 year old male who presents to clinic today for the following health issues:        Diabetes Follow-up    Patient is checking blood sugars: twice daily.    Blood sugar testing frequency justification: 250-300  Results are as follows:         am - VARIES         lunchtime - VARIES         suppertime - VARIES    Diabetic concerns: None     Symptoms of hypoglycemia (low blood sugar): none     Paresthesias (numbness or burning in feet) or sores: No     Date of last diabetic eye exam: ?   Pt has not been taking his medicines as directed  Hyperlipidemia Follow-Up      Rate your low fat/cholesterol diet?: good    Taking statin?  Yes, no muscle aches from statin    Other lipid medications/supplements?:  none     Hypertension Follow-up      Outpatient blood pressures are not being checked.    Low Salt Diet: no added salt         Amount of exercise or physical activity: None    Problems taking medications regularly: No    Medication side effects: none    Diet: diabetic    Vascular Disease Follow-up:  Coronary Artery Disease (CAD)      Chest pain or pressure, left side neck or arm pain: No    Shortness of breath/increased sweats/nausea with exertion: No    Pain in calves walking 1-2 blocks: No    Worsened or new symptoms since last visit: No    Nitroglycerin use: no    Daily aspirin use: Yes       Problem list and histories reviewed & adjusted, as indicated.  Additional history: as documented    Patient Active Problem List   Diagnosis     Adenomatous polyp of colon     Cardiomyopathy (H)     Cocaine dependence (H)     Current smoker     Presbyopia     Obstructive sleep apnea syndrome     Sensorineural hearing loss     Type 2 diabetes mellitus with other circulatory complications (H)     Hyperlipidemia LDL goal <100     Chronic hepatitis C without hepatic coma (H)     Coronary artery disease involving native heart without angina pectoris,  unspecified vessel or lesion type     Insomnia, unspecified type     Chronic obstructive pulmonary disease, unspecified COPD type (H)     Diabetic polyneuropathy associated with type 2 diabetes mellitus (H)     Essential hypertension with goal blood pressure less than 140/90     Morbid obesity due to excess calories (H)     Gastroesophageal reflux disease with esophagitis     History of acute myocardial infarction     Past Surgical History   Procedure Laterality Date     Stent, coronary, devin  9-2015     stent placement     Cholecystectomy  2009     Laparoscopy, surgical; repair incisional or ventral hernia  2011       Social History   Substance Use Topics     Smoking status: Former Smoker     Types: Cigarettes     Quit date: 08/01/2016     Smokeless tobacco: Never Used     Alcohol Use: No     Family History   Problem Relation Age of Onset     DIABETES No family hx of      Coronary Artery Disease No family hx of      CANCER Father      Liver ca         Current Outpatient Prescriptions   Medication Sig Dispense Refill     LEDIPASVIR-SOFOSBUVIR  MG per tablet        insulin aspart (NOVOLOG FLEXPEN) 100 UNIT/ML injection INJECT 10 UNITS SUBCUTANEOUSLY THREE TIMES DAILY WITH MEALS. (NEED TO BE SEEN IN CLINIC FOR FURTHER REFILLS)  Refills at MD visit 15 mL 0     insulin glargine (LANTUS SOLOSTAR) 100 UNIT/ML injection INJECT 35 UNITS SUBCUTANEOUSLY AT BEDTIME 10 mL 0     carvedilol (COREG) 6.25 MG tablet Take 1 tablet (6.25 mg) by mouth 2 times daily 180 tablet 3     atorvastatin (LIPITOR) 40 MG tablet Take 1 tablet (40 mg) by mouth daily 90 tablet 0     lisinopril (PRINIVIL/ZESTRIL) 40 MG tablet Take 1 tablet (40 mg) by mouth daily 90 tablet 3     insulin pen needle 31G X 6 MM Use 4 daily or as directed. 100 each prn     aspirin 81 MG tablet Take by mouth daily       Docusate Sodium (GENTLE STOOL SOFTENER PO) Take 200 mg by mouth daily        blood glucose monitoring ([employer] MICROLET) lancets as needed        nitroglycerin (NITROSTAT) 0.4 MG SL tablet Place 0.4 mg under the tongue as needed       clopidogrel (PLAVIX) 75 MG tablet Take 75 mg by mouth daily       fenofibrate 145 MG tablet Take 145 mg by mouth daily       albuterol (PROAIR HFA, PROVENTIL HFA, VENTOLIN HFA) 108 (90 BASE) MCG/ACT inhaler Inhale 2 puffs into the lungs every 4 hours as needed for shortness of breath / dyspnea or wheezing q 4 hours as needed 3 Inhaler 3     traZODone (DESYREL) 100 MG tablet Take 1 tablet (100 mg) by mouth At Bedtime At night (Patient taking differently: Take 100 mg by mouth nightly as needed for sleep At night) 90 tablet 3     omeprazole (PRILOSEC) 40 MG capsule Take 1 capsule (40 mg) by mouth daily 90 capsule 3     atorvastatin (LIPITOR) 40 MG tablet Take 1 tablet (40 mg) by mouth daily 30 tablet 1     ipratropium - albuterol 0.5 mg/2.5 mg/3 mL (DUONEB) 0.5-2.5 (3) MG/3ML nebulization Take 1 vial (3 mLs) by nebulization every 4 hours as needed for shortness of breath / dyspnea or wheezing 360 mL 3     [DISCONTINUED] NOVOLOG FLEXPEN 100 UNIT/ML soln INJECT 10 UNITS SUBCUTANEOUSLY THREE TIMES DAILY WITH MEALS. (NEED TO BE SEEN IN CLINIC FOR FURTHER REFILLS) 15 mL 0     [DISCONTINUED] LANTUS SOLOSTAR 100 UNIT/ML soln INJECT 35 UNITS SUBCUTANEOUSLY AT BEDTIME 10 mL 0     [DISCONTINUED] carvedilol (COREG) 6.25 MG tablet TAKE ONE TABLET BY MOUTH TWICE A  tablet 0     [DISCONTINUED] atorvastatin (LIPITOR) 40 MG tablet TAKE ONE TABLET BY MOUTH EVERY DAY 90 tablet 0     [DISCONTINUED] lisinopril (PRINIVIL/ZESTRIL) 40 MG tablet TAKE ONE TABLET BY MOUTH EVERY DAY 90 tablet 0     Allergies   Allergen Reactions     Food Anaphylaxis     Cherries     Recent Labs   Lab Test  01/31/17   1438  08/24/16   0923  07/15/16   0758   A1C  11.8*  9.9*  9.9*   LDL   --    --   129*   HDL   --    --   42   TRIG   --    --   341*   ALT   --   93*  85*   CR   --    --   0.98   GFRESTIMATED   --    --   79   GFRESTBLACK   --    --   >90    American GFR Calc     POTASSIUM   --    --   3.9   TSH   --    --   3.22      BP Readings from Last 3 Encounters:   01/31/17 104/76   10/19/16 116/63   09/07/16 146/95    Wt Readings from Last 3 Encounters:   01/31/17 240 lb (108.863 kg)   10/19/16 246 lb (111.585 kg)   09/07/16 249 lb 4.8 oz (113.082 kg)                  Labs reviewed in EPIC  Problem list, Medication list, Allergies, and Medical/Social/Surgical histories reviewed in [facility] and updated as appropriate.    ROS:  C: NEGATIVE for fever, chills, change in weight  INTEGUMENTARY/SKIN: NEGATIVE for worrisome rashes, moles or lesions  E/M: NEGATIVE for ear, mouth and throat problems  R: NEGATIVE for significant cough or SOB  CV: NEGATIVE for chest pain, palpitations or peripheral edema  GI: NEGATIVE for nausea, abdominal pain, heartburn, or change in bowel habits  PSYCHIATRIC: NEGATIVE for changes in mood or affect    OBJECTIVE:                                                    /76 mmHg  Pulse 94  Temp(Src) 97.9  F (36.6  C)  Resp 14  Wt 240 lb (108.863 kg)  SpO2 95%  Body mass index is 36.5 kg/(m^2).    EXAM:  /76 mmHg  Pulse 94  Temp(Src) 97.9  F (36.6  C)  Resp 14  Wt 240 lb (108.863 kg)  SpO2 95%      GENERAL APPEARANCE: alert and no acute distress  GENERAL APPEARANCE: obese  PSYCH: mentation appears normal and affect normal/bright  RESP: lungs clear to auscultation - no rales, rhonchi or wheezes  CV: regular rate and rhythm, normal S1 S2, no S3 or S4 and no murmur, click or rub -  EXT: no cyanosis or edema in lower extremities  SKIN: no venous stasis changes  Foot Exam: normal DP and PT pulses, no trophic changes or ulcerative lesions and pt has Mild numbness left Colette;le Toe  Rest of the exam is normal         Diagnostic Test Results:  Results for orders placed or performed in visit on 01/31/17 (from the past 24 hour(s))   HEMOGLOBIN A1C   Result Value Ref Range    Hemoglobin A1C 11.8 (H) 4.3 - 6.0 %        ASSESSMENT/PLAN:         "                                                BMI:   Estimated body mass index is 36.5 kg/(m^2) as calculated from the following:    Height as of 10/19/16: 5' 7.99\" (1.727 m).    Weight as of this encounter: 240 lb (108.863 kg).   Weight management plan: Low bakari diet/exercise      1. Type 2 diabetes mellitus with other circulatory complication, with long-term current use of insulin (H)  Pt has Not been taking meds as recommended   Advised take medicine  Discussed Risk of uncontrolled Diabetes  - HEMOGLOBIN A1C  - insulin aspart (NOVOLOG FLEXPEN) 100 UNIT/ML injection; INJECT 10 UNITS SUBCUTANEOUSLY THREE TIMES DAILY WITH MEALS. (NEED TO BE SEEN IN CLINIC FOR FURTHER REFILLS)  Refills at MD visit  Dispense: 15 mL; Refill: 0  - insulin glargine (LANTUS SOLOSTAR) 100 UNIT/ML injection; INJECT 35 UNITS SUBCUTANEOUSLY AT BEDTIME  Dispense: 10 mL; Refill: 0  - atorvastatin (LIPITOR) 40 MG tablet; Take 1 tablet (40 mg) by mouth daily  Dispense: 90 tablet; Refill: 0  - insulin pen needle 31G X 6 MM; Use 4 daily or as directed.  Dispense: 100 each; Refill: prn  Advised see MTM  Follow up with me in 2 weeks  He has a health   2. Morbid obesity due to excess calories (H)  Low bakari diet    3. Essential hypertension with goal blood pressure less than 140/90  controlled  - BASIC METABOLIC PANEL  - carvedilol (COREG) 6.25 MG tablet; Take 1 tablet (6.25 mg) by mouth 2 times daily  Dispense: 180 tablet; Refill: 3  - lisinopril (PRINIVIL/ZESTRIL) 40 MG tablet; Take 1 tablet (40 mg) by mouth daily  Dispense: 90 tablet; Refill: 3    4. Cardiomyopathy (H)    - HEART FAILURE ACTION PLAN ORDER    5. Coronary artery disease involving native heart without angina pectoris, unspecified vessel or lesion type  Stable     6. Chronic hepatitis C without hepatic coma (H)  On Harvoni  - HEPATIC PANEL    7. Diabetic polyneuropathy associated with type 2 diabetes mellitus (H)  Advised daily foot check    8. Hyperlipidemia LDL goal <100  Will " check-though pt just started meds  - atorvastatin (LIPITOR) 40 MG tablet; Take 1 tablet (40 mg) by mouth daily  Dispense: 90 tablet; Refill: 0  - Lipid panel reflex to direct LDL    9. Need for prophylactic vaccination and inoculation against influenza  Advised     10. Encounter for immunization  Advised   - TDAP (ADACEL AGES 11-64)    Work on weight loss  Regular exercise  Follow up 2 months  Fariba Ding MD  [facility]

## 2017-02-01 LAB
ALBUMIN SERPL-MCNC: 3.4 G/DL (ref 3.4–5)
ALP SERPL-CCNC: 77 U/L (ref 40–150)
ALT SERPL W P-5'-P-CCNC: 48 U/L (ref 0–70)
ANION GAP SERPL CALCULATED.3IONS-SCNC: 11 MMOL/L (ref 3–14)
AST SERPL W P-5'-P-CCNC: 22 U/L (ref 0–45)
BILIRUB DIRECT SERPL-MCNC: 0.1 MG/DL (ref 0–0.2)
BILIRUB SERPL-MCNC: 0.6 MG/DL (ref 0.2–1.3)
BUN SERPL-MCNC: 14 MG/DL (ref 7–30)
CALCIUM SERPL-MCNC: 8.9 MG/DL (ref 8.5–10.1)
CHLORIDE SERPL-SCNC: 103 MMOL/L (ref 94–109)
CHOLEST SERPL-MCNC: 235 MG/DL
CO2 SERPL-SCNC: 25 MMOL/L (ref 20–32)
CREAT SERPL-MCNC: 0.8 MG/DL (ref 0.66–1.25)
GFR SERPL CREATININE-BSD FRML MDRD: ABNORMAL ML/MIN/1.7M2
GLUCOSE SERPL-MCNC: 133 MG/DL (ref 70–99)
HDLC SERPL-MCNC: 37 MG/DL
LDLC SERPL CALC-MCNC: 129 MG/DL
NONHDLC SERPL-MCNC: 198 MG/DL
POTASSIUM SERPL-SCNC: 3.9 MMOL/L (ref 3.4–5.3)
PROT SERPL-MCNC: 7 G/DL (ref 6.8–8.8)
SODIUM SERPL-SCNC: 139 MMOL/L (ref 133–144)
TRIGL SERPL-MCNC: 343 MG/DL

## 2017-02-02 ENCOUNTER — VIRTUAL VISIT (OUTPATIENT)
Dept: NURSING | Facility: CLINIC | Age: 57
End: 2017-02-02

## 2017-02-02 DIAGNOSIS — E66.01 MORBID OBESITY DUE TO EXCESS CALORIES (H): ICD-10-CM

## 2017-02-02 DIAGNOSIS — E11.59 TYPE 2 DIABETES MELLITUS WITH OTHER CIRCULATORY COMPLICATIONS (H): Primary | ICD-10-CM

## 2017-02-02 PROCEDURE — 99207 ZZC HEALTH COACHING, NO CHARGE: CPT

## 2017-02-02 NOTE — PROGRESS NOTES
February 2, 2017    [facility]  [address]  [place] [address]  [phone]  Health Coaching Progress Note    Patient Name: Hill Ardon Date: February 2, 2017      Session Length: 30      DATA    PRM Master Survey Scores Reviewed: Yes    Core Healthy Days Survey:         JEFE Score (Last Two) 7/14/2016 7/25/2016   JEFE Raw Score 41 38   Activation Score 63.2 52.9   JEFE Level 3 2       PHQ-2 Score 1/31/2017 8/24/2016   PHQ-2 Total Score Interpretation - Positive if 3 or more points; Administer PHQ-9 if positive 0 0       No flowsheet data found.    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    What Patient Does Well:   Geoffrey has been doing a good job getting back on track with his care management and has followed up with appropriate providers.     Previous Successes:   Geoffrey states that his BG level at one point yesterday was 91 which was good to see after they had been high for a while before he was able to resume taking his medications again.    Areas in Need of Improvement:   Hill says the area in need of improvement at this time is his tobacco cessation. Geoffrey reports that the stress at home was just too much but he feels it has improved enough for him to try again. Geoffrey's new quit date is February 4 and he plans to quit cold turkey.    Barriers to Change:   Geoffrey states that now he is good, but before he met his out-of-pocket deductible he couldn't afford his medications which was his main barrier. Now that he has met his deductible, Geoffrey is confident that this will not be a barrier any  longer.    Reasons for Change:   Geoffrey wants to change for his health.    Plan/Goal for the Next 4 Weeks:     GOAL #1: Continue tobacco cessation  GOAL #1 Progress Toward Goal: 50%  GOAL #2: Continue decreasing sweets  GOAL #2 Progress Toward Goal: 100%    Intervention:  Motivational Interviewing    MI Intervention: Expressed Empathy/Understanding, Supported Autonomy, Collaboration, Evocation, Permission to raise concern or advise, Open-ended questions, Reflections: simple and complex, Change talk (evoked) and Reframe     Change Talk Expressed by the Patient: Desire to change Ability to change Reasons to change Committment to change Activation Taking steps    Provider Response to Change Talk: E - Evoked more info from patient about behavior change, A - Affirmed patient's thoughts, decisions, or attempts at behavior change, R - Reflected patient's change talk and S - Summarized patient's change talk statements    Assessment / Progress on Treatment Objective(s) / Homework:    Satisfactory progress - ACTION (Actively working towards change); Intervened by reinforcing change plan / affirming steps taken         Plan: (Homework, other):  Patient was encouraged to continue to seek condition-related information and education, as well as schedule a follow up appointment with the Health  in 4 weeks. Patient has set self-identified goals and will monitor progress until the next appointment.  Next visit scheduled for March 2 at 11:30AM.      Sam Elam

## 2017-02-08 ENCOUNTER — ALLIED HEALTH/NURSE VISIT (OUTPATIENT)
Dept: PHARMACY | Facility: CLINIC | Age: 57
End: 2017-02-08
Payer: COMMERCIAL

## 2017-02-08 DIAGNOSIS — E78.5 HYPERLIPIDEMIA LDL GOAL <100: ICD-10-CM

## 2017-02-08 DIAGNOSIS — K21.00 GASTROESOPHAGEAL REFLUX DISEASE WITH ESOPHAGITIS: ICD-10-CM

## 2017-02-08 DIAGNOSIS — F17.200 TOBACCO USE DISORDER: ICD-10-CM

## 2017-02-08 DIAGNOSIS — I25.10 CORONARY ARTERY DISEASE INVOLVING NATIVE HEART WITHOUT ANGINA PECTORIS, UNSPECIFIED VESSEL OR LESION TYPE: ICD-10-CM

## 2017-02-08 DIAGNOSIS — E11.59 TYPE 2 DIABETES MELLITUS WITH OTHER CIRCULATORY COMPLICATIONS (H): Primary | ICD-10-CM

## 2017-02-08 DIAGNOSIS — B18.2 CHRONIC HEPATITIS C WITHOUT HEPATIC COMA (H): ICD-10-CM

## 2017-02-08 DIAGNOSIS — I10 ESSENTIAL HYPERTENSION: ICD-10-CM

## 2017-02-08 PROCEDURE — 99606 MTMS BY PHARM EST 15 MIN: CPT | Performed by: PHARMACIST

## 2017-02-08 PROCEDURE — 99607 MTMS BY PHARM ADDL 15 MIN: CPT | Performed by: PHARMACIST

## 2017-02-08 NOTE — PROGRESS NOTES
SUBJECTIVE/OBJECTIVE:                                                    Hill Ardon is a 56 year old male called for a follow up visit for Medication Therapy Management.  He was referred to me from MARY Witt.     Chief Complaint: He is concerned he is taking Omeprazole.     Allergies/ADRs: None  Tobacco: 0-1 pack per day - is interested in quitting Tobacco Cessation Action Plan: Pharmacotherapies : other Nicotine replacement and cold turkey  Alcohol: history of alcohol dependence  Caffeine: 8-10 cups/day of coffee  Activity: not much  PMH: Reviewed in Epic    Medication Adherence: No issues since he met his deductible.    HCV:  Been on for 2 weeks, going well. No side effects. No adherence issues.   -[facility] Only -   Genotype: 1a  Viral Load and date drawn: 18 million on 7/15/16  Previous treatment: Treatment naive  Liver staging: F1-2 on 16  Child Mosher score: N/A  HIV positive: No  Renal impairment CrCl <30mL/min: No  Decompensated Cirrhosis: No  Recurrent HCV post-liver transplant: No  Hepatocellular Carcinoma: No   Initial Regimen: Harvoni (ledipasvir/sofosbuvir)  Length of Therapy Ordered: 12 weeks  DDIx with HCV therapy: Omeprazole, Atorvastatin, Carvedilol.  MTM Evaluation: HCV therapy appropriate and Length of therapy appropriate    GERD: Current medications include: Prilosec (omeprazole) 40mg daily, but he is holding this since Harvoni start. Patient feels that current regimen is effective. He has been doing well as long as he avoids spicy/ greasy foods.     Diabetes:  Pt currently taking Novolog 14 units only twice daily, Lantus 38 units daily.  Denies Side effects  SMBG: three times daily.   Ranges (patient reported): Fasting in the mornin-130, in the evening and before other meals: 150-170.   Symptoms of low blood sugar? none. Frequency of hypoglycemia? never.  Recent symptoms of high blood sugar? polydipsia and polyuria  Aspirin: Taking 81mg daily and denies  side effects  Diet/Exercise: pt states he was doing great on diabetic diet, but has fallen off the wagon so to speak.     Hx of Stent in 2015: Pt is taking Aspirin 81mg daily,he has restarted Clopidogrel. His cardiologist is at [facility], Dr. Rojas. Federico JOHNSON.     Hypertension: He is taking Carvedilol 6.25mg BID. He is taking Lisinopril 40mg daily. Patient does not self-monitor BP, last in clinic was 112/77.  Patient reports no current medication side effects.    Smoking Cessation:  How much does he smoke:  1-2 every other cigarettes, reduced from 10 cigarettes. He recently quit for 6 weeks, but started after dropping to 14mg.  His girlfriend quit smoking, which motivated him to quit as well. He is interested in setting another quit date. He thinks he would do better quitting cold turkey.      Hyperlipidemia: Current therapy includes Atorvastatin 40mg daily, Fenofibrate 160mg daily.  Pt reports no significant myalgias or other side effects. Hx of stent placement.   The 10-year ASCVD risk score ([place] DC Jr., et al., 2013) is: 16.3%    Values used to calculate the score:      Age: 56 years      Sex: Male      Is an : No      Diabetic: Yes      Tobacco smoker: No      Systolic Blood Pressure: 104 mmHg      Prescribed Antihypertensives: Yes      HDL Cholesterol: 37 mg/dL      Total Cholesterol: 235 mg/dL    Current labs include:  BP Readings from Last 3 Encounters:   01/31/17 104/76   10/19/16 116/63   09/07/16 146/95     Today's Vitals: There were no vitals taken for this visit.  A1C     11.8   1/31/2017  A1C      9.9   8/24/2016  A1C      9.9   7/15/2016    CHOL      239   7/15/2016  TRIG      341   7/15/2016  HDL       42   7/15/2016  LDL      129   7/15/2016    Liver Function Studies -   Recent Labs   Lab Test  08/24/16   0923   PROTTOTAL  7.4   ALBUMIN  3.6   BILITOTAL  0.4   ALKPHOS  65   AST  42   ALT  93*     Lab Results   Component Value Date    UCRR 76 07/14/2016    MICROL 8 07/14/2016     UMALCR 10.58 07/14/2016     Last Basic Metabolic Panel:  NA      134   7/15/2016   POTASSIUM      3.9   7/15/2016  CHLORIDE       97   7/15/2016  BUN       16   7/15/2016  CR     0.98   7/15/2016  GFR ESTIMATE   Date Value Ref Range Status   01/31/2017 >90  Non  GFR Calc   >60 mL/min/1.7m2 Final   07/15/2016 79 >60 mL/min/1.7m2 Final     Comment:     Non  GFR Calc       TSH   Date Value Ref Range Status   07/15/2016 3.22 0.40 - 4.00 mU/L Final   ]    Most Recent Immunizations   Administered Date(s) Administered     Influenza Vaccine IM 3yrs+ 4 Valent IIV4 01/31/2017     Pneumococcal 23 valent 06/30/2015     TD (ADULT, 7+) 04/22/2007     TDAP (ADACEL AGES 11-64) 01/31/2017     ASSESSMENT:                                                       Current medications were reviewed today.     Medication Adherence: No issues    HCV: Stable.   Follow up on DDIx:   -Omeprazole- Pt has D/C'd Omeprazole before starting Harvoni, no issues   -Atorvastatin- Moderate interaction, Harvoni may increase Atorvastatin levels, pt to monitor for myalgias. No issues   -Carvedilol- Moderate interaction. Pt to monitor for exercise fatigue, weakness, dizziness. Harvoni may increase Carvedilol levels. No issues    GERD: Stable. Doing well off of Omeprazole    Diabetes: Needs improvement. Pt's BG mostly at goal per pt report. Last A1c was 11.8%. Patient to continue to record his BG values in a journal. We will discuss specific BG values at next visit, pt did not have them with him today.     Hx of Stent in 2015: Needs improvement. Pt is 2 years post stent, he is not indicated for continuing Clopidogrel use. We will discuss this at next visit.     Hypertension: Stable. Last BP at goal, pt restarting Carvedilol, see HCV assessment for information on DDIx with Harvoni, not major.     Smoking Cessation: Needs improvement. Pt is down to 1-2 cigs daily. Pt to quit by next appointment in 2-3 weeks.      Hyperlipidemia:  Stable. No issues.    PLAN:                                                      Pt to...  1. Talk with your Cardiologist about whether or not you should continue Clopidogrel. This is usually only continued for 1 year after stent placement, but sometimes they do continue it for longer.     2. Keep recording your blood sugars and blood pressures in your journal. We will go over this at our next visit!    3. You've reduced your cigarette use quite substantially! Good work! Now the next step will be to stop them completely. Try to quit completely before our next visit, you are almost there!    I spent 25 minutes with this patient today.  All changes were made via collaborative practice agreement with Fariba Ding. A copy of the visit note was provided to the patient's primary care provider.    Will follow up in 3 weeks.    The patient was sent via [employer] a summary of these recommendations as an after visit summary.     Jose Healy, PharmD  [facility] Pharmacist    Phone: [phone]

## 2017-02-08 NOTE — Clinical Note
I talked with this patient! You may want to call him and set up his lab appointments. He should be good to go. About 2 weeks into therapy.   Jose Healy, PharmD [facility] Pharmacist  Phone: [phone]

## 2017-02-08 NOTE — PATIENT INSTRUCTIONS
Recommendations from today's MTM visit:                                                    MTM (medication therapy management) is a service provided by a clinical pharmacist designed to help you get the most of out of your medicines.   Today we reviewed what your medicines are for, how to know if they are working, that your medicines are safe and how to make your medicine regimen as easy as possible.     1. Talk with your Cardiologist about whether or not you should continue Clopidogrel. This is usually only continued for 1 year after stent placement, but sometimes they do continue it for longer.     2. Keep recording your blood sugars and blood pressures in your journal. We will go over this at our next visit!    3. You've reduced your cigarette use quite substantially! Good work! Now the next step will be to stop them completely. Try to quit completely before our next visit, you are almost there!    4. Your Harvoni treatment is going great! Let me know if you have any questions.     Next MTM visit: 3/1/17 at 9:30AM over the phone.     To schedule another MTM appointment, please call the clinic directly or you may call the MTM scheduling line at [phone] or toll-free at [phone].     My Clinical Pharmacist's contact information:                                                      It was a pleasure seeing you today!  Please feel free to contact me with any questions or concerns you have.      Jose Healy, PharmD  MTM Pharmacist    Phone: [phone]     You may receive a survey about the MTM services you received.  I would appreciate your feedback to help me serve you better in the future. Please fill it out and return it when you can. Your comments will be anonymous.

## 2017-02-09 ENCOUNTER — CARE COORDINATION (OUTPATIENT)
Dept: GASTROENTEROLOGY | Facility: CLINIC | Age: 57
End: 2017-02-09

## 2017-02-09 DIAGNOSIS — B18.2 CHRONIC HEPATITIS C WITHOUT HEPATIC COMA (H): Primary | ICD-10-CM

## 2017-02-09 NOTE — PROGRESS NOTES
2/9/2017  1:28 PM      Hep C Care Coordination Call   Connected with patient for f/u on Hep C treatment Start. Patient received his Harvoni which he will be taking for x 12 weeks. Patient did connect with Iglesia JAVED And there are no medication interaction concerns. Patient denied any recent changes in health or changes in medication. Patient did start taking his Hep C treatment yesterday 2/8/17. Reviewed the following Hep C treatment education and POC;    Below is a summary of your treatment schedule. Please follow the schedule as closely as possible. Labs should be drawn as close to the date indicated at the [facility] or [facility].    Hepatitis C Treatment  Treatment: Harvoni x 12 weeks  Genotype: 1a  Stage Fibrosis: F1 (to F2)  Treatment Naive       Start Date: 01/26/17    Week 4  Hepatic Panel, BMP Lab Due: 2/23/2017  Office Visit Date:   With Erna Blount PA-C on 2/23/17 at 11:00am at the [facility] building located at [address] [place] [address] on the 3rd floor. Please arrive at 10:00am to have your labs drawn on the first floor prior to your provider appointment. You do not need to fast for these labs.    Week 12 - End of Treatment  HCV RNA Quant Lab Due. Patient will have this lab drawn at the [facility] on 4/20/17 at 10:00am. You do not need to fast for this lab.       3 Months Post Treatment  HCV RNA Quant Lab Due. Patient will have this lab drawn at the [facility] on 7/19/17 at 10:00am. You do not need to fast for this lab.       Educational information to patient on Hep C treatment;     -Contact the [facility] Hepatology clinic and speak with RN Care Coordinator prior to starting any new prescribed or OTC medications.   -Take medications exactly as prescribed, do not change dose or stop taking without consulting your provider.   -Take Medication one time each day with or without food  -If you miss a dose of medication, then take it as soon as you remember on the same day. If not remembered on  the same day, then skip the dose and take the next dose at the usual time. Do not take more than the recommended dose. Contact the clinic if you miss a dose.    Please contact the pharmacy 1-2 weeks prior to needing a medication refill.      Side Effects  The most common side effects of Hep C medication treatment can include:  -tiredness  -headache  Notify the clinic of any side effects that bother you or that do not go away.   Possible side effects have been discussed.   Patient has been instructed to clinic for rash, itching or unmanageable nausea.    How to store Hep C Treatment Medications  -Store Medication at room temperature below 86 degrees F  -Keep Medication in it's original container  -Do not use Medication if the seal is broken or missing    General information  It is not known if treatment will prevent you from infecting another person or reinfecting yourself with the hepatitis C virus during treatment. It is best that as soon as you start treatment to buy a new toothbrush, disposable razors (if you use a rotating shaver you do not need to buy a new one) and nail clippers. If you check your blood sugar at home, please dispose of the fingerstick needle after each use and DO NOT REUSE the insulin needles. These items should not be shared with anyone.        If you have any questions, please contact the main clinic at [phone] or your RN Care Coordinator at [phone]. We appreciate you choosing the [facility] clinic for your treatment. Patient agrees to Hep C treatment POC and verbalizes understanding. Patient will receive a copy of treatment plan in the mail, address verified with patient. Patient has no further questions or concerns. Hep C care team updated on patient status.      Kristin Anthony RN, BSN, PHN  [facility]  Care Coordinator for Hepatology Clinic/Specialty Program

## 2017-02-09 NOTE — Clinical Note
February 9, 2017       TO: Hill Ardon  [address]  [place] [address]       Dear Mr. Ardon,      Below is a summary of your treatment schedule. Please follow the schedule as closely as possible. Labs should be drawn as close to the date indicated at the [facility] or [facility].    Hepatitis C Treatment  Treatment: Harvoni x 12 weeks        Start Date: 01/26/17    Week 4  Hepatic Panel, BMP Lab Due: 2/23/2017  Office Visit Date:   With Erna Blount PA-C on 2/23/17 at 11:00am at the [facility] building located at [address] [place] [address] on the 3rd floor. Please arrive at 10:00am to have your labs drawn on the first floor prior to your provider appointment. You do not need to fast for these labs.    Week 12 - End of Treatment  HCV RNA Quant Lab Due. Patient will have this lab drawn at the [facility] on 4/20/17 at 10:00am. You do not need to fast for this lab.       3 Months Post Treatment  HCV RNA Quant Lab Due. Patient will have this lab drawn at the [facility] on 7/19/17 at 10:00am. You do not need to fast for this lab.             Educational information to patient on Hep C treatment;     -Contact the [facility] Hepatology clinic and speak with RN Care Coordinator prior to starting any new prescribed or OTC medications.   -Take medications exactly as prescribed, do not change dose or stop taking without consulting your provider.   -Take Medication one time each day with or without food  -If you miss a dose of medication, then take it as soon as you remember on the same day. If not remembered on the same day, then skip the dose and take the next dose at the usual time. Do not take more than the recommended dose. Contact the clinic if you miss a dose.    Please contact the pharmacy 1-2 weeks prior to needing a medication refill.      Side Effects  The most common side effects of Hep C medication treatment can include:  -tiredness  -headache  Notify the clinic of any side effects  that bother you or that do not go away.   Possible side effects have been discussed.   Patient has been instructed to clinic for rash, itching or unmanageable nausea.    How to store Hep C Treatment Medications  -Store Medication at room temperature below 86 degrees F  -Keep Medication in it's original container  -Do not use Medication if the seal is broken or missing    General information  It is not known if treatment will prevent you from infecting another person or reinfecting yourself with the hepatitis C virus during treatment. It is best that as soon as you start treatment to buy a new toothbrush, disposable razors (if you use a rotating shaver you do not need to buy a new one) and nail clippers. If you check your blood sugar at home, please dispose of the fingerstick needle after each use and DO NOT REUSE the insulin needles. These items should not be shared with anyone.        If you have any questions, please contact the main clinic at [phone] or your RN Care Coordinator at [phone]. We appreciate you choosing the [facility] clinic for your treatment.         Kristin Anthony RN, BSN, PHN  [facility]  Care Coordinator for Hepatology Clinic/Specialty Program

## 2017-02-20 ENCOUNTER — TELEPHONE (OUTPATIENT)
Dept: GASTROENTEROLOGY | Facility: CLINIC | Age: 57
End: 2017-02-20

## 2017-02-20 NOTE — TELEPHONE ENCOUNTER
Patient contacted and reminded of upcoming appointment.  Patient confirmed they will be attending.  Patient instructed to bring updated medications list to appointment.  Instructed pt to arrive an hour and a half to two hours prior to appt time for labs.  Bassam Jones, CMA

## 2017-02-23 ENCOUNTER — OFFICE VISIT (OUTPATIENT)
Dept: GASTROENTEROLOGY | Facility: CLINIC | Age: 57
End: 2017-02-23
Attending: PHYSICIAN ASSISTANT
Payer: COMMERCIAL

## 2017-02-23 VITALS
HEIGHT: 69 IN | SYSTOLIC BLOOD PRESSURE: 133 MMHG | BODY MASS INDEX: 34.9 KG/M2 | DIASTOLIC BLOOD PRESSURE: 84 MMHG | OXYGEN SATURATION: 95 % | HEART RATE: 79 BPM | TEMPERATURE: 98.6 F | WEIGHT: 235.6 LBS

## 2017-02-23 DIAGNOSIS — E66.01 MORBID OBESITY DUE TO EXCESS CALORIES (H): ICD-10-CM

## 2017-02-23 DIAGNOSIS — B18.2 CHRONIC HEPATITIS C WITHOUT HEPATIC COMA (H): Primary | ICD-10-CM

## 2017-02-23 DIAGNOSIS — I25.10 CORONARY ARTERY DISEASE INVOLVING NATIVE HEART WITHOUT ANGINA PECTORIS, UNSPECIFIED VESSEL OR LESION TYPE: ICD-10-CM

## 2017-02-23 DIAGNOSIS — E11.59 TYPE 2 DIABETES MELLITUS WITH OTHER CIRCULATORY COMPLICATIONS (H): ICD-10-CM

## 2017-02-23 DIAGNOSIS — E11.42 DIABETIC POLYNEUROPATHY ASSOCIATED WITH TYPE 2 DIABETES MELLITUS (H): ICD-10-CM

## 2017-02-23 DIAGNOSIS — B18.2 CHRONIC HEPATITIS C WITHOUT HEPATIC COMA (H): ICD-10-CM

## 2017-02-23 LAB
ALBUMIN SERPL-MCNC: 3.8 G/DL (ref 3.4–5)
ALP SERPL-CCNC: 65 U/L (ref 40–150)
ALT SERPL W P-5'-P-CCNC: 25 U/L (ref 0–70)
ANION GAP SERPL CALCULATED.3IONS-SCNC: 7 MMOL/L (ref 3–14)
AST SERPL W P-5'-P-CCNC: 15 U/L (ref 0–45)
BILIRUB DIRECT SERPL-MCNC: 0.2 MG/DL (ref 0–0.2)
BILIRUB SERPL-MCNC: 0.5 MG/DL (ref 0.2–1.3)
BUN SERPL-MCNC: 19 MG/DL (ref 7–30)
CALCIUM SERPL-MCNC: 9.6 MG/DL (ref 8.5–10.1)
CHLORIDE SERPL-SCNC: 101 MMOL/L (ref 94–109)
CO2 SERPL-SCNC: 30 MMOL/L (ref 20–32)
CREAT SERPL-MCNC: 0.91 MG/DL (ref 0.66–1.25)
GFR SERPL CREATININE-BSD FRML MDRD: 85 ML/MIN/1.7M2
GLUCOSE SERPL-MCNC: 94 MG/DL (ref 70–99)
HBA1C MFR BLD: 10.2 % (ref 4.3–6)
POTASSIUM SERPL-SCNC: 4.3 MMOL/L (ref 3.4–5.3)
PROT SERPL-MCNC: 7.6 G/DL (ref 6.8–8.8)
SODIUM SERPL-SCNC: 138 MMOL/L (ref 133–144)

## 2017-02-23 PROCEDURE — 83036 HEMOGLOBIN GLYCOSYLATED A1C: CPT | Performed by: FAMILY MEDICINE

## 2017-02-23 PROCEDURE — 99212 OFFICE O/P EST SF 10 MIN: CPT | Mod: ZF

## 2017-02-23 PROCEDURE — 80076 HEPATIC FUNCTION PANEL: CPT | Performed by: FAMILY MEDICINE

## 2017-02-23 PROCEDURE — 80048 BASIC METABOLIC PNL TOTAL CA: CPT | Performed by: FAMILY MEDICINE

## 2017-02-23 PROCEDURE — 36415 COLL VENOUS BLD VENIPUNCTURE: CPT | Performed by: FAMILY MEDICINE

## 2017-02-23 ASSESSMENT — PAIN SCALES - GENERAL: PAINLEVEL: NO PAIN (0)

## 2017-02-23 NOTE — PROGRESS NOTES
Division of Gastroenterology, Hepatology and Nutrition Department of Medicine     Assessment/Plan  Chronic Hepatitis C  genotype 1a Stage 1-2 fibrosis (MR [facility] 9/29/16)  DM T2 with diabetic polyneuropathy  Cardiomyopathy  COPD  Morbid Obesity         Hill Ardon  has chronic hepatitis C, genotype 1a.  He had an MR elastography 9/29/16 that shows he has stage 1-2 fibrosis.  He  has finished week 4 of treatment with Harvoni.   He is part of the Prioritize Study.  He is tolerating treatment very well with no side effects to report.  He  will be done with the 12 week course of treatment on 4/20/17.  The 3 month s/p HCV RNA quant is due 7/19/17 to see if He has a sustained virological response.  This would be considered a cure.   If he  achieves the SVR, he  will no loner need to be followed in hepatology as he  has stage 1-2 fibrosis.       He is advised to watch for any new or increased side effects with the medication and call our hepatology nurse if this were to happen.    Thank you for allowing me to participate in the care of this patient.  If you have any questions regarding this visit and plan of care, please do not hesitate to page me at [phone].    Erna Blount, MS, PA-C  Division of Gastroenterology, Hepatology and Nutrition      HPI  Hill Ardon is a pleasant 56 year old  male  chronic hepatitis C.  He is genotype 1a.  He did  a liver biopsy in 2009 and the results showed stage 1 fibrosis.  He  is  treatment naive.   He started HCV treatment on 1/26/17.  He is part of the Prioritize Study.   The patient is taking Harvoni.   He returns to clinic today , 4 weeks into treatment.   He  is experiencing these symptoms: none.  His  labs today show  ALT of 25 and AST of 15.      ROS:  Comprehensive review of systems is negative, unless otherwise noted above    Past Medical History   Diagnosis Date     Cardiomyopathy (H)      Cocaine abuse      COPD (chronic obstructive pulmonary disease) (H)       Diabetes (H)      GERD (gastroesophageal reflux disease)      Hepatitis C      Hyperlipemia      Hypertension      Morbid obesity (H)      Neuropathy (H)      Sleep apnea        Current Outpatient Prescriptions   Medication Sig Dispense Refill     LEDIPASVIR-SOFOSBUVIR  MG per tablet        insulin aspart (NOVOLOG FLEXPEN) 100 UNIT/ML injection INJECT 10 UNITS SUBCUTANEOUSLY THREE TIMES DAILY WITH MEALS. (NEED TO BE SEEN IN CLINIC FOR FURTHER REFILLS)  Refills at MD visit 15 mL 0     insulin glargine (LANTUS SOLOSTAR) 100 UNIT/ML injection INJECT 35 UNITS SUBCUTANEOUSLY AT BEDTIME 10 mL 0     atorvastatin (LIPITOR) 40 MG tablet Take 1 tablet (40 mg) by mouth daily 90 tablet 0     lisinopril (PRINIVIL/ZESTRIL) 40 MG tablet Take 1 tablet (40 mg) by mouth daily 90 tablet 3     insulin pen needle 31G X 6 MM Use 4 daily or as directed. 100 each prn     aspirin 81 MG tablet Take by mouth daily       Docusate Sodium (GENTLE STOOL SOFTENER PO) Take 200 mg by mouth daily        blood glucose monitoring (ROBYN MICROLET) lancets as needed       nitroglycerin (NITROSTAT) 0.4 MG SL tablet Place 0.4 mg under the tongue as needed       clopidogrel (PLAVIX) 75 MG tablet Take 75 mg by mouth daily       fenofibrate 145 MG tablet Take 145 mg by mouth daily       albuterol (PROAIR HFA, PROVENTIL HFA, VENTOLIN HFA) 108 (90 BASE) MCG/ACT inhaler Inhale 2 puffs into the lungs every 4 hours as needed for shortness of breath / dyspnea or wheezing q 4 hours as needed 3 Inhaler 3     traZODone (DESYREL) 100 MG tablet Take 1 tablet (100 mg) by mouth At Bedtime At night (Patient taking differently: Take 100 mg by mouth nightly as needed for sleep At night) 90 tablet 3     atorvastatin (LIPITOR) 40 MG tablet Take 1 tablet (40 mg) by mouth daily 30 tablet 1     ipratropium - albuterol 0.5 mg/2.5 mg/3 mL (DUONEB) 0.5-2.5 (3) MG/3ML nebulization Take 1 vial (3 mLs) by nebulization every 4 hours as needed for shortness of breath / dyspnea  or wheezing 360 mL 3     omeprazole (PRILOSEC) 40 MG capsule Take 1 capsule (40 mg) by mouth daily (Patient not taking: Reported on 2/23/2017) 90 capsule 3       Allergies   Allergen Reactions     Food Anaphylaxis     Cherries       Family History   Problem Relation Age of Onset     DIABETES No family hx of      Coronary Artery Disease No family hx of      CANCER Father      Liver ca       Social History     Social History     Marital status: Single     Spouse name: N/A     Number of children: 2     Years of education: N/A     Occupational History     On disability Disability     cardiomyopathy     Social History Main Topics     Smoking status: Former Smoker     Types: Cigarettes     Quit date: 8/1/2016     Smokeless tobacco: Never Used     Alcohol use No     Drug use: No     Sexual activity: Yes     Partners: Female     Other Topics Concern     Not on file     Social History Narrative       OBJECTIVE  Vitals: There were no vitals taken for this visit. There is no height or weight on file to calculate BMI.  Gen: No acute distress, well nourished  Eyes: Sclera anicteric  Skin:  no jaundice  Psych: Normal speech, normal affect    Recent Laboratory Test Results  Lab Results   Component Value Date    WBC 7.0 08/24/2016    HGB 14.8 08/24/2016    HCT 42.9 08/24/2016     08/24/2016    CHOL 235 (H) 01/31/2017    TRIG 343 (H) 01/31/2017    HDL 37 (L) 01/31/2017    ALT 25 02/23/2017    AST 15 02/23/2017     02/23/2017    TSH 3.22 07/15/2016    INR 0.94 09/07/2016    ALBUMIN 3.8 02/23/2017

## 2017-02-23 NOTE — NURSING NOTE
Chief Complaint   Patient presents with     RECHECK     Hepatitis C Treatment   Pt roomed, vitals, meds, and allergies reviewed with pt. Pt ready for provider.  Bassam Jones, CMA

## 2017-02-23 NOTE — LETTER
2/23/2017      RE: Hill Ardon  [address]  [facility] [id number]       Division of Gastroenterology, Hepatology and Nutrition Department of Medicine     Assessment/Plan  Chronic Hepatitis C  genotype 1a Stage 1-2 fibrosis (MR [facility] 9/29/16)  DM T2 with diabetic polyneuropathy  Cardiomyopathy  COPD  Morbid Obesity         Hill Ardon  has chronic hepatitis C, genotype 1a.  He had an MR elastography 9/29/16 that shows he has stage 1-2 fibrosis.  He  has finished week 4 of treatment with Harvoni.   He is part of the Prioritize Study.  He is tolerating treatment very well with no side effects to report.  He  will be done with the 12 week course of treatment on 4/20/17.  The 3 month s/p HCV RNA quant is due 7/19/17 to see if He has a sustained virological response.  This would be considered a cure.   If he  achieves the SVR, he  will no loner need to be followed in hepatology as he  has stage 1-2 fibrosis.       He is advised to watch for any new or increased side effects with the medication and call our hepatology nurse if this were to happen.    Thank you for allowing me to participate in the care of this patient.  If you have any questions regarding this visit and plan of care, please do not hesitate to page me at [phone].    Erna Blount MS, PA-C  Division of Gastroenterology, Hepatology and Nutrition      HPI  Hill Ardon is a pleasant 56 year old  male  chronic hepatitis C.  He is genotype 1a.  He did  a liver biopsy in 2009 and the results showed stage 1 fibrosis.  He  is  treatment naive.   He started HCV treatment on 1/26/17.  He is part of the Prioritize Study.   The patient is taking Harvoni.   He returns to clinic today , 4 weeks into treatment.   He  is experiencing these symptoms: none.  His  labs today show  ALT of 25 and AST of 15.      ROS:  Comprehensive review of systems is negative, unless otherwise noted above    Past Medical History   Diagnosis Date      Cardiomyopathy (H)      Cocaine abuse      COPD (chronic obstructive pulmonary disease) (H)      Diabetes (H)      GERD (gastroesophageal reflux disease)      Hepatitis C      Hyperlipemia      Hypertension      Morbid obesity (H)      Neuropathy (H)      Sleep apnea        Current Outpatient Prescriptions   Medication Sig Dispense Refill     LEDIPASVIR-SOFOSBUVIR  MG per tablet        insulin aspart (NOVOLOG FLEXPEN) 100 UNIT/ML injection INJECT 10 UNITS SUBCUTANEOUSLY THREE TIMES DAILY WITH MEALS. (NEED TO BE SEEN IN CLINIC FOR FURTHER REFILLS)  Refills at MD visit 15 mL 0     insulin glargine (LANTUS SOLOSTAR) 100 UNIT/ML injection INJECT 35 UNITS SUBCUTANEOUSLY AT BEDTIME 10 mL 0     atorvastatin (LIPITOR) 40 MG tablet Take 1 tablet (40 mg) by mouth daily 90 tablet 0     lisinopril (PRINIVIL/ZESTRIL) 40 MG tablet Take 1 tablet (40 mg) by mouth daily 90 tablet 3     insulin pen needle 31G X 6 MM Use 4 daily or as directed. 100 each prn     aspirin 81 MG tablet Take by mouth daily       Docusate Sodium (GENTLE STOOL SOFTENER PO) Take 200 mg by mouth daily        blood glucose monitoring (ROBYN MICROLET) lancets as needed       nitroglycerin (NITROSTAT) 0.4 MG SL tablet Place 0.4 mg under the tongue as needed       clopidogrel (PLAVIX) 75 MG tablet Take 75 mg by mouth daily       fenofibrate 145 MG tablet Take 145 mg by mouth daily       albuterol (PROAIR HFA, PROVENTIL HFA, VENTOLIN HFA) 108 (90 BASE) MCG/ACT inhaler Inhale 2 puffs into the lungs every 4 hours as needed for shortness of breath / dyspnea or wheezing q 4 hours as needed 3 Inhaler 3     traZODone (DESYREL) 100 MG tablet Take 1 tablet (100 mg) by mouth At Bedtime At night (Patient taking differently: Take 100 mg by mouth nightly as needed for sleep At night) 90 tablet 3     atorvastatin (LIPITOR) 40 MG tablet Take 1 tablet (40 mg) by mouth daily 30 tablet 1     ipratropium - albuterol 0.5 mg/2.5 mg/3 mL (DUONEB) 0.5-2.5 (3) MG/3ML nebulization  Take 1 vial (3 mLs) by nebulization every 4 hours as needed for shortness of breath / dyspnea or wheezing 360 mL 3     omeprazole (PRILOSEC) 40 MG capsule Take 1 capsule (40 mg) by mouth daily (Patient not taking: Reported on 2/23/2017) 90 capsule 3       Allergies   Allergen Reactions     Food Anaphylaxis     Cherries       Family History   Problem Relation Age of Onset     DIABETES No family hx of      Coronary Artery Disease No family hx of      CANCER Father      Liver ca       Social History     Social History     Marital status: Single     Spouse name: N/A     Number of children: 2     Years of education: N/A     Occupational History     On disability Disability     cardiomyopathy     Social History Main Topics     Smoking status: Former Smoker     Types: Cigarettes     Quit date: 8/1/2016     Smokeless tobacco: Never Used     Alcohol use No     Drug use: No     Sexual activity: Yes     Partners: Female     Other Topics Concern     Not on file     Social History Narrative       OBJECTIVE  Vitals: There were no vitals taken for this visit. There is no height or weight on file to calculate BMI.  Gen: No acute distress, well nourished  Eyes: Sclera anicteric  Skin:  no jaundice  Psych: Normal speech, normal affect    Recent Laboratory Test Results  Lab Results   Component Value Date    WBC 7.0 08/24/2016    HGB 14.8 08/24/2016    HCT 42.9 08/24/2016     08/24/2016    CHOL 235 (H) 01/31/2017    TRIG 343 (H) 01/31/2017    HDL 37 (L) 01/31/2017    ALT 25 02/23/2017    AST 15 02/23/2017     02/23/2017    TSH 3.22 07/15/2016    INR 0.94 09/07/2016    ALBUMIN 3.8 02/23/2017           Erna Blount PA-C

## 2017-03-01 ENCOUNTER — ALLIED HEALTH/NURSE VISIT (OUTPATIENT)
Dept: PHARMACY | Facility: CLINIC | Age: 57
End: 2017-03-01
Payer: COMMERCIAL

## 2017-03-01 DIAGNOSIS — B18.2 CHRONIC HEPATITIS C WITHOUT HEPATIC COMA (H): ICD-10-CM

## 2017-03-01 DIAGNOSIS — E11.59 TYPE 2 DIABETES MELLITUS WITH OTHER CIRCULATORY COMPLICATIONS (H): Primary | ICD-10-CM

## 2017-03-01 DIAGNOSIS — F17.200 CURRENT SMOKER: ICD-10-CM

## 2017-03-01 PROCEDURE — 99607 MTMS BY PHARM ADDL 15 MIN: CPT | Performed by: PHARMACIST

## 2017-03-01 PROCEDURE — 99606 MTMS BY PHARM EST 15 MIN: CPT | Performed by: PHARMACIST

## 2017-03-01 NOTE — PATIENT INSTRUCTIONS
Recommendations from today's MTM visit:                                                        1. Keep up the good work! Try replacing one of your two cigarettes every day with nicotine gum or a lozenge. Work on taking mini quit breaks through your normal smoking times. You can do it!    2. When you stick to your new diet, your blood sugars are great! Keep on the straight an narrow.    Next MTM visit: 4/5 at 9 AM    To schedule another MTM appointment, please call the clinic directly or you may call the MTM scheduling line at [phone] or toll-free at [phone].     My Clinical Pharmacist's contact information:                                                      It was a pleasure seeing you today!  Please feel free to contact me with any questions or concerns you have.      Jose Healy, PharmD  MTM Pharmacist    Phone: [phone]     You may receive a survey about the MTM services you received.  I would appreciate your feedback to help me serve you better in the future. Please fill it out and return it when you can. Your comments will be anonymous.

## 2017-03-01 NOTE — PROGRESS NOTES
SUBJECTIVE/OBJECTIVE:                                                    Hill Ardon is a 56 year old male called for a follow up visit for Medication Therapy Management.  He was referred to me from MARY Witt.     Chief Complaint: He is concerned he is taking Omeprazole.     Allergies/ADRs: None  Tobacco: 0-1 pack per day - is interested in quitting Tobacco Cessation Action Plan: Pharmacotherapies : other Nicotine replacement and cold turkey  Alcohol: history of alcohol dependence  Caffeine: 8-10 cups/day of coffee  Activity: not much  PMH: Reviewed in Epic    Medication Adherence: No issues since he met his deductible.    HCV: No side effects. No adherence issues.   -[facility] Use Only -   Genotype: 1a  Viral Load and date drawn: 18 million on 7/15/16  Previous treatment: Treatment naive  Liver staging: F1-2 on 9/26/16  Child Mosher score: N/A  HIV positive: No  Renal impairment CrCl <30mL/min: No  Decompensated Cirrhosis: No  Recurrent HCV post-liver transplant: No  Hepatocellular Carcinoma: No   Initial Regimen: Harvoni (ledipasvir/sofosbuvir)  Length of Therapy Ordered: 12 weeks  DDIx with HCV therapy: Omeprazole, Atorvastatin, Carvedilol.  MTM Evaluation: HCV therapy appropriate and Length of therapy appropriate    Diabetes:  Pt currently taking Novolog 12 units TID daily, Lantus 34-35 units daily.  Denies Side effects  SMBG: three times daily.   Ranges (patient reported): see below  Symptoms of low blood sugar? none. Frequency of hypoglycemia? never.  Recent symptoms of high blood sugar? polydipsia and polyuria  Aspirin: Taking 81mg daily and denies side effects  Diet/Exercise: has started watching his sugar intake and carbohydrates (cut out potatoes), being having oatmeal/ cream of wheat in the morning.   Date FBG/ 2hours post Lunch/2hours post Dinner /2hours post Bedtime   3/1       2/28 147 197  149   2/27 240 212  190   2/26 (had some sugar this day) 91 120  198   2/25 98 131  130    2/24 130 105  121   2/23 118 107  201   2/22    105     Smoking Cessation:  How much does he smoke:  2 every other cigarettes, reduced from 10 cigarettes. He is not using His girlfriend quit smoking, which motivated him to quit as well. He is interested in setting another quit date. He thinks he would do better quitting cold turkey. Pt has a lot of stress right now, custody of his 2 year old grandson, daughter in rehab for meth addiction right now.      Current labs include:  BP Readings from Last 3 Encounters:   02/23/17 133/84   01/31/17 104/76   10/19/16 116/63     Today's Vitals: There were no vitals taken for this visit.  A1C     11.8   1/31/2017  A1C      9.9   8/24/2016  A1C      9.9   7/15/2016    CHOL      239   7/15/2016  TRIG      341   7/15/2016  HDL       42   7/15/2016  LDL      129   7/15/2016    Liver Function Studies -   Recent Labs   Lab Test  08/24/16   0923   PROTTOTAL  7.4   ALBUMIN  3.6   BILITOTAL  0.4   ALKPHOS  65   AST  42   ALT  93*     Lab Results   Component Value Date    UCRR 76 07/14/2016    MICROL 8 07/14/2016    UMALCR 10.58 07/14/2016     Last Basic Metabolic Panel:  NA      134   7/15/2016   POTASSIUM      3.9   7/15/2016  CHLORIDE       97   7/15/2016  BUN       16   7/15/2016  CR     0.98   7/15/2016  GFR Estimate   Date Value Ref Range Status   02/23/2017 85 >60 mL/min/1.7m2 Final     Comment:     Non  GFR Calc   01/31/2017 >90  Non  GFR Calc   >60 mL/min/1.7m2 Final   07/15/2016 79 >60 mL/min/1.7m2 Final     Comment:     Non  GFR Calc       TSH   Date Value Ref Range Status   07/15/2016 3.22 0.40 - 4.00 mU/L Final   ]    Most Recent Immunizations   Administered Date(s) Administered     Influenza Vaccine IM 3yrs+ 4 Valent IIV4 01/31/2017     Pneumococcal 23 valent 06/30/2015     TD (ADULT, 7+) 04/22/2007     TDAP (ADACEL AGES 11-64) 01/31/2017     ASSESSMENT:                                                       Current  medications were reviewed today.     Medication Adherence: No issues    HCV: Stable. Tolerating therapy well.  Follow up on DDIx:   -Omeprazole- Pt has D/C'd Omeprazole before starting Harvoni, no issues   -Atorvastatin- Moderate interaction, Harvoni may increase Atorvastatin levels, pt to monitor for myalgias. No issues   -Carvedilol- Moderate interaction. Pt to monitor for exercise fatigue, weakness, dizziness. Harvoni may increase Carvedilol levels. No issues    Diabetes: Needs Improvement. Patient is not meeting A1c goal of < 7%. Majority of BG are well within goal when patient is following his improved diet. Will follow up again with patient in a month to assure he is sticking to his diet. We will discuss possibility of adding Metformin and decreasing insulin at next visit.     Smoking Cessation: Needs improvement. Pt is under a lot of stress between his daughter in rehab and taking care of his Grandson. Recommended he try to remove one of his 2 cigarettes every day and practice going long periods of time without smoking. He has come down from 10 cigs since I started talking to him.      PLAN:                                                      Pt to...  1. Continue current therapy.     I spent 20 minutes with this patient today.  All changes were made via collaborative practice agreement with Fariba Ding. A copy of the visit note was provided to the patient's primary care provider.    Will follow up in 4 weeks. Will discuss Metformin use in the future.     The patient was sent via [employer] a summary of these recommendations as an after visit summary.     Jose Healy, PharmD  [facility] Pharmacist    Phone: [phone]

## 2017-03-01 NOTE — MR AVS SNAPSHOT
After Visit Summary   3/1/2017    Hill Ardon    MRN: [id number]           Patient Information     Date Of Birth          1960        Visit Information        Provider Department      3/1/2017 9:30 AM [facility]        Today's Diagnoses     Type 2 diabetes mellitus with other circulatory complications (H)    -  1    Current smoker        Chronic hepatitis C without hepatic coma (H)          Care Instructions    Recommendations from today's MTM visit:                                                        1. Keep up the good work! Try replacing one of your two cigarettes every day with nicotine gum or a lozenge. Work on taking mini quit breaks through your normal smoking times. You can do it!    2. When you stick to your new diet, your blood sugars are great! Keep on the straight an narrow.    Next MTM visit: 4/5 at 9 AM    To schedule another MTM appointment, please call the clinic directly or you may call the MTM scheduling line at [phone] or toll-free at [phone].     My Clinical Pharmacist's contact information:                                                      It was a pleasure seeing you today!  Please feel free to contact me with any questions or concerns you have.      Jose Healy, PharmD  MTM Pharmacist    Phone: [phone]     You may receive a survey about the MTM services you received.  I would appreciate your feedback to help me serve you better in the future. Please fill it out and return it when you can. Your comments will be anonymous.                Follow-ups after your visit        Your next 10 appointments already scheduled     Mar 02, 2017 11:30 AM CST   Telephone Visit with HEALTH  - REGION 2   [facility] ([facility]    [address]  [place] [address] [phone]           Note: this is not an onsite visit; there is no need to come to the facility.             Apr 05, 2017  9:00 AM CDT   TELEMEDICINE with Jose Healy [facility] Medication Therapy Management ([facility])    [address] [place] [address]              Note: this is not an onsite visit; there is no need to come to the facility.            Apr 20, 2017 10:00 AM CDT   LAB with  LAB   [facility] ([facility])    [address] [place] [address] [phone]           Patient must bring picture ID.  Patient should be prepared to give a urine specimen  Please do not eat 10-12 hours before your appointment if you are coming in fasting for labs on lipids, cholesterol, or glucose (sugar).  Pregnant women should follow their Care Team instructions. Water with medications is okay. Do not drink coffee or other fluids.   If you have concerns about taking  your medications, please ask at office or if scheduling via [employer], send a message by clicking on Secure Messaging, Message Your Care Team.            Jul 19, 2017 10:00 AM CDT   LAB with  LAB   [facility] ([facility])    [address] [address] [phone]           Patient must bring picture ID.  Patient should be prepared to give a urine specimen  Please do not eat 10-12 hours before your appointment if you are coming in fasting for labs on lipids, cholesterol, or glucose (sugar).  Pregnant women should follow their Care Team instructions. Water with medications is okay. Do not drink coffee or other fluids.   If you have concerns about taking  your medications, please ask at office or if scheduling via [employer], send a message by clicking on Secure Messaging, Message Your Care Team.              Who to contact     If you have questions or need follow up information about today's clinic visit or your schedule please contact [facility] MEDICATION THERAPY MANAGEMENT directly  at No information on file..  Normal or non-critical lab and imaging results will be communicated to you by Storelli Sportshart, letter or phone within 4 business days after the clinic has received the results. If you do not hear from us within 7 days, please contact the clinic through Teez[employer] or phone. If you have a critical or abnormal lab result, we will notify you by phone as soon as possible.  Submit refill requests through Cell Therapy or call your pharmacy and they will forward the refill request to us. Please allow 3 business days for your refill to be completed.          Additional Information About Your Visit        Storelli Sportshart Information     Cell Therapy gives you secure access to your electronic health record. If you see a primary care provider, you can also send messages to your care team and make appointments. If you have questions, please call your primary care clinic.  If you do not have a primary care provider, please call [phone] and they will assist you.        Care EveryWhere ID     This is your Care EveryWhere ID. This could be used by other organizations to access your [place] medical records  [id number]         Blood Pressure from Last 3 Encounters:   02/23/17 133/84   01/31/17 104/76   10/19/16 116/63    Weight from Last 3 Encounters:   02/23/17 235 lb 9.6 oz (106.9 kg)   01/31/17 240 lb (108.9 kg)   10/19/16 246 lb (111.6 kg)              Today, you had the following     No orders found for display         Today's Medication Changes          These changes are accurate as of: 3/1/17 10:03 AM.  If you have any questions, ask your nurse or doctor.               These medicines have changed or have updated prescriptions.        Dose/Directions    traZODone 100 MG tablet   Commonly known as:  DESYREL   This may have changed:    - when to take this  - reasons to take this  - additional instructions   Used for:  Insomnia, unspecified type        Dose:  100 mg   Take 1 tablet (100 mg) by mouth At Bedtime At night    Quantity:  90 tablet   Refills:  3                Primary Care Provider Office Phone # Fax #    Fariba Ding [phone]       [address] [address]        Thank you!     Thank you for choosing [facility] MEDICATION THERAPY MANAGEMENT  for your care. Our goal is always to provide you with excellent care. Hearing back from our patients is one way we can continue to improve our services. Please take a few minutes to complete the written survey that you may receive in the mail after your visit with us. Thank you!             Your Updated Medication List - Protect others around you: Learn how to safely use, store and throw away your medicines at www.disposemymeds.org.          This list is accurate as of: 3/1/17 10:03 AM.  Always use your most recent med list.                   Brand Name Dispense Instructions for use    albuterol 108 (90 BASE) MCG/ACT Inhaler    PROAIR HFA/PROVENTIL HFA/VENTOLIN HFA    3 Inhaler    Inhale 2 puffs into the lungs every 4 hours as needed for shortness of breath / dyspnea or wheezing q 4 hours as needed       aspirin 81 MG tablet      Take by mouth daily       * atorvastatin 40 MG tablet    LIPITOR    30 tablet    Take 1 tablet (40 mg) by mouth daily       * atorvastatin 40 MG tablet    LIPITOR    90 tablet    Take 1 tablet (40 mg) by mouth daily       blood glucose monitoring lancets      as needed       clopidogrel 75 MG tablet    PLAVIX     Take 75 mg by mouth daily       fenofibrate 145 MG tablet      Take 145 mg by mouth daily       GENTLE STOOL SOFTENER PO      Take 200 mg by mouth daily       insulin aspart 100 UNIT/ML injection    NovoLOG FLEXPEN    15 mL    INJECT 10 UNITS SUBCUTANEOUSLY THREE TIMES DAILY WITH MEALS. (NEED TO BE SEEN IN CLINIC FOR FURTHER REFILLS) Refills at MD visit       insulin glargine 100 UNIT/ML injection    LANTUS SOLOSTAR    10 mL    INJECT 35 UNITS SUBCUTANEOUSLY AT BEDTIME       insulin pen  needle 31G X 6 MM     100 each    Use 4 daily or as directed.       ipratropium - albuterol 0.5 mg/2.5 mg/3 mL 0.5-2.5 (3) MG/3ML neb solution    DUONEB    360 mL    Take 1 vial (3 mLs) by nebulization every 4 hours as needed for shortness of breath / dyspnea or wheezing       ledipasvir-sofosbuvir  MG per tablet   Generic drug:  ledipasvir-sofosbuvir          lisinopril 40 MG tablet    PRINIVIL/ZESTRIL    90 tablet    Take 1 tablet (40 mg) by mouth daily       nitroglycerin 0.4 MG sublingual tablet    NITROSTAT     Place 0.4 mg under the tongue as needed       omeprazole 40 MG capsule    priLOSEC    90 capsule    Take 1 capsule (40 mg) by mouth daily       traZODone 100 MG tablet    DESYREL    90 tablet    Take 1 tablet (100 mg) by mouth At Bedtime At night       * Notice:  This list has 2 medication(s) that are the same as other medications prescribed for you. Read the directions carefully, and ask your doctor or other care provider to review them with you.

## 2017-03-02 ENCOUNTER — VIRTUAL VISIT (OUTPATIENT)
Dept: NURSING | Facility: CLINIC | Age: 57
End: 2017-03-02

## 2017-03-02 DIAGNOSIS — E66.01 MORBID OBESITY DUE TO EXCESS CALORIES (H): Primary | ICD-10-CM

## 2017-03-02 DIAGNOSIS — I10 ESSENTIAL HYPERTENSION WITH GOAL BLOOD PRESSURE LESS THAN 140/90: ICD-10-CM

## 2017-03-02 PROCEDURE — 99207 ZZC HEALTH COACHING, NO CHARGE: CPT

## 2017-03-02 NOTE — PROGRESS NOTES
March 2, 2017    [facility]  [address]  [place] [address]  [phone]  Health Coaching Progress Note    Patient Name: Hill Ardon Date: March 2, 2017      Session Length: 25      DATA    PRM Master Survey Scores Reviewed: Yes    Core Healthy Days Survey:         JEFE Score (Last Two) 7/14/2016 7/25/2016   JEFE Raw Score 41 38   Activation Score 63.2 52.9   JEFE Level 3 2       PHQ-2 Score 1/31/2017 8/24/2016   PHQ-2 Total Score Interpretation - Positive if 3 or more points; Administer PHQ-9 if positive 0 0       No flowsheet data found.    Treatment Objective(s) Addressed in This Session:  Target Behavior(s): diet/weight loss, tobacco use:  cutting back/cessation and disease management/lifestyle changes increasing exercise    Current Stressors / Issues:  Geoffrey is currently dealing with a lot of stress.    What Patient Does Well:   Geoffrey is doing a good job with working on all of his health goals overall and states that he's eating better, exercising more, smoking less, and taking his medications.    Previous Successes:   Geoffrey had his A1c rechecked recently and it decreased to 10.2 (2/23/17) from 11.8 (1/31/17).    Areas in Need of Improvement:   Geoffrey states that tobacco cessation is the area in need of improvement currently. Geoffrey states that he is still thinking of himself as a non-smoker and feels he is ready to quit completely again soon but doesn't have a date in mind yet. Geoffrey really thinks the best way for him to quit is cold turkey but states that he also has gum  (nicotine and non-nicotine) on hand in case he needs to have something.      Barriers to Change:   Stress or very stressful occurrences are usually the reason that Geoffrey slips or relapses so writer encourages Geoffrey to make some plans for other things he will try in order to manage stress when he decides to quit again.    Reasons for Change:   Geoffrey wants to quit smoking for  his health.    Plan/Goal for the Next 4 Weeks:     GOAL #1: Continue tobacco cessation-cutting down from 3 cigs/day  GOAL #1 Progress Toward Goal: 75%  GOAL #2: Continue decreasing sweets  GOAL #2 Progress Toward Goal: 100%  GOAL #3: Continue walking-about every other day  GOAL #3 Progress Toward Goal: 50%    Intervention:  Motivational Interviewing    MI Intervention: Expressed Empathy/Understanding, Supported Autonomy, Collaboration, Evocation, Permission to raise concern or advise, Open-ended questions, Reflections: simple and complex, Change talk (evoked) and Reframe     Change Talk Expressed by the Patient: Desire to change Reasons to change Committment to change Activation Taking steps    Provider Response to Change Talk: E - Evoked more info from patient about behavior change, A - Affirmed patient's thoughts, decisions, or attempts at behavior change, R - Reflected patient's change talk and S - Summarized patient's change talk statements    Assessment / Progress on Treatment Objective(s) / Homework:    Satisfactory progress - ACTION (Actively working towards change); Intervened by reinforcing change plan / affirming steps taken         Plan: (Homework, other):  Patient was encouraged to continue to seek condition-related information and education, as well as schedule a follow up appointment with the Health  next week. Patient has set self-identified goals and will monitor progress until the next appointment.  Next visit scheduled for March 9 at 11:30AM.      Sam Elam

## 2017-03-02 NOTE — MR AVS SNAPSHOT
After Visit Summary   3/2/2017    Hill Ardon    MRN: [id number]           Patient Information     Date Of Birth          1960        Visit Information        Provider Department      3/2/2017 11:30 AM HEALTH  - [address]        Today's Diagnoses     Morbid obesity due to excess calories (H)    -  1    Essential hypertension with goal blood pressure less than 140/90           Follow-ups after your visit        Your next 10 appointments already scheduled     Mar 09, 2017 11:30 AM CST   Telephone Visit with HEALTH  - [facility]   [facility] ([facility])    [address] [address]   [phone]           Note: this is not an onsite visit; there is no need to come to the facility.            Apr 05, 2017  9:00 AM CDT   TELEMEDICINE with Jose Healy [facility] Medication Therapy Management ([facility])    [address] [place] [address]  [address] [facility] [address]              Note: this is not an onsite visit; there is no need to come to the facility.            Apr 20, 2017 10:00 AM CDT   LAB with UC LAB    [facility] ([facility])    [address] [place] [address]  1st Floor  [facility] [address] [phone]           Patient must bring picture ID.  Patient should be prepared to give a urine specimen  Please do not eat 10-12 hours before your appointment if you are coming in fasting for labs on lipids, cholesterol, or glucose (sugar).  Pregnant women should follow their Care Team instructions. Water with medications is okay. Do not drink coffee or other fluids.   If you have concerns about taking  your medications, please ask at office or if scheduling via [employer], send a message by clicking on Secure Messaging, Message Your Care Team.            Jul 19, 2017 10:00 AM CDT   LAB with UC LAB    [facility] ([facility])    [address] [place] [address]  [address]  [address] [facility] [address] [phone]           Patient must bring picture ID.  Patient should be prepared to give a urine specimen  Please do not eat 10-12 hours before your appointment if you are coming in fasting for labs on lipids, cholesterol, or glucose (sugar).  Pregnant women should follow their Care Team instructions. Water with medications is okay. Do not drink coffee or other fluids.   If you have concerns about taking  your medications, please ask at office or if scheduling via Fari[employer]ak, send a message by clicking on Secure Messaging, Message Your Care Team.              Who to contact     If you have questions or need follow up information about today's clinic visit or your schedule please contact [facility] directly at [phone].  Normal or non-critical lab and imaging results will be communicated to you by oboxohart, letter or phone within 4 business days after the clinic has received the results. If you do not hear from us within 7 days, please contact the clinic through [employer] or phone. If you have a critical or abnormal lab result, we will notify you by phone as soon as possible.  Submit refill requests through Fariqak or call your pharmacy and they will forward the refill request to us. Please allow 3 business days for your refill to be completed.          Additional Information About Your Visit        Fariqak Information     F[employer] gives you secure access to your electronic health record. If you see a primary care provider, you can also send messages to your care team and make appointments. If you have questions, please call your primary care clinic.  If you do not have a primary care provider, please call [phone] and they will assist you.        Care EveryWhere ID     This is your Care EveryWhere ID. This could be used by other organizations to access your [place] medical records  [id number]         Blood Pressure from Last 3 Encounters:    02/23/17 133/84   01/31/17 104/76   10/19/16 116/63    Weight from Last 3 Encounters:   02/23/17 235 lb 9.6 oz (106.9 kg)   01/31/17 240 lb (108.9 kg)   10/19/16 246 lb (111.6 kg)              Today, you had the following     No orders found for display         Today's Medication Changes          These changes are accurate as of: 3/2/17  2:45 PM.  If you have any questions, ask your nurse or doctor.               These medicines have changed or have updated prescriptions.        Dose/Directions    traZODone 100 MG tablet   Commonly known as:  DESYREL   This may have changed:    - when to take this  - reasons to take this  - additional instructions   Used for:  Insomnia, unspecified type        Dose:  100 mg   Take 1 tablet (100 mg) by mouth At Bedtime At night   Quantity:  90 tablet   Refills:  3                Primary Care Provider Office Phone # Fax #    Fariba Ding [phone]       [address] [address]        Thank you!     Thank you for choosing [facility]  for your care. Our goal is always to provide you with excellent care. Hearing back from our patients is one way we can continue to improve our services. Please take a few minutes to complete the written survey that you may receive in the mail after your visit with us. Thank you!             Your Updated Medication List - Protect others around you: Learn how to safely use, store and throw away your medicines at www.disposemymeds.org.          This list is accurate as of: 3/2/17  2:45 PM.  Always use your most recent med list.                   Brand Name Dispense Instructions for use    albuterol 108 (90 BASE) MCG/ACT Inhaler    PROAIR HFA/PROVENTIL HFA/VENTOLIN HFA    3 Inhaler    Inhale 2 puffs into the lungs every 4 hours as needed for shortness of breath / dyspnea or wheezing q 4 hours as needed       aspirin 81 MG tablet      Take by mouth daily       * atorvastatin 40 MG  tablet    LIPITOR    30 tablet    Take 1 tablet (40 mg) by mouth daily       * atorvastatin 40 MG tablet    LIPITOR    90 tablet    Take 1 tablet (40 mg) by mouth daily       blood glucose monitoring lancets      as needed       clopidogrel 75 MG tablet    PLAVIX     Take 75 mg by mouth daily       fenofibrate 145 MG tablet      Take 145 mg by mouth daily       GENTLE STOOL SOFTENER PO      Take 200 mg by mouth daily       insulin aspart 100 UNIT/ML injection    NovoLOG FLEXPEN    15 mL    INJECT 10 UNITS SUBCUTANEOUSLY THREE TIMES DAILY WITH MEALS. (NEED TO BE SEEN IN CLINIC FOR FURTHER REFILLS) Refills at MD visit       insulin glargine 100 UNIT/ML injection    LANTUS SOLOSTAR    10 mL    INJECT 35 UNITS SUBCUTANEOUSLY AT BEDTIME       insulin pen needle 31G X 6 MM     100 each    Use 4 daily or as directed.       ipratropium - albuterol 0.5 mg/2.5 mg/3 mL 0.5-2.5 (3) MG/3ML neb solution    DUONEB    360 mL    Take 1 vial (3 mLs) by nebulization every 4 hours as needed for shortness of breath / dyspnea or wheezing       ledipasvir-sofosbuvir  MG per tablet   Generic drug:  ledipasvir-sofosbuvir          lisinopril 40 MG tablet    PRINIVIL/ZESTRIL    90 tablet    Take 1 tablet (40 mg) by mouth daily       nitroglycerin 0.4 MG sublingual tablet    NITROSTAT     Place 0.4 mg under the tongue as needed       omeprazole 40 MG capsule    priLOSEC    90 capsule    Take 1 capsule (40 mg) by mouth daily       traZODone 100 MG tablet    DESYREL    90 tablet    Take 1 tablet (100 mg) by mouth At Bedtime At night       * Notice:  This list has 2 medication(s) that are the same as other medications prescribed for you. Read the directions carefully, and ask your doctor or other care provider to review them with you.

## 2017-03-29 ENCOUNTER — VIRTUAL VISIT (OUTPATIENT)
Dept: NURSING | Facility: CLINIC | Age: 57
End: 2017-03-29

## 2017-03-29 DIAGNOSIS — I10 ESSENTIAL HYPERTENSION WITH GOAL BLOOD PRESSURE LESS THAN 140/90: ICD-10-CM

## 2017-03-29 DIAGNOSIS — E66.01 MORBID OBESITY DUE TO EXCESS CALORIES (H): ICD-10-CM

## 2017-03-29 DIAGNOSIS — E11.59 TYPE 2 DIABETES MELLITUS WITH OTHER CIRCULATORY COMPLICATIONS (H): Primary | ICD-10-CM

## 2017-03-29 PROCEDURE — 99207 ZZC HEALTH COACHING, NO CHARGE: CPT

## 2017-03-29 NOTE — MR AVS SNAPSHOT
After Visit Summary   3/29/2017    Hill Ardon    MRN: [id number]           Patient Information     Date Of Birth          1960        Visit Information        Provider Department      3/29/2017 1:00 PM HEALTH  - [address]        Today's Diagnoses     Type 2 diabetes mellitus with other circulatory complications (H)    -  1    Morbid obesity due to excess calories (H)        Essential hypertension with goal blood pressure less than 140/90           Follow-ups after your visit        Your next 10 appointments already scheduled     Apr 05, 2017  9:00 AM CDT   TELEMEDICINE with Jose Healy [facility] Medication Therapy Management ([facility])    [address] [place] [address]  [address] [facility] [id number] [phone]           Note: this is not an onsite visit; there is no need to come to the facility.            Apr 20, 2017 10:00 AM CDT   LAB with  LAB   [facility] ([facility])    [address] [place] [id number] [phone]           Patient must bring picture ID.  Patient should be prepared to give a urine specimen  Please do not eat 10-12 hours before your appointment if you are coming in fasting for labs on lipids, cholesterol, or glucose (sugar).  Pregnant women should follow their Care Team instructions. Water with medications is okay. Do not drink coffee or other fluids.   If you have concerns about taking  your medications, please ask at office or if scheduling via eMindfulhart, send a message by clicking on Secure Messaging, Message Your Care Team.            Apr 26, 2017 10:00 AM CDT   Telephone Visit with HEALTH  - [address] [place] ([facility]    [address] [facility] [address]   [phone]           Note: this is not an onsite visit; there is no need to come to the facility.            Jul 19, 2017 10:00 AM CDT    LAB with  LAB   [facility] ([facility])    [address] [facility]  1st Floor  [facility] [address] [phone]           Patient must bring picture ID.  Patient should be prepared to give a urine specimen  Please do not eat 10-12 hours before your appointment if you are coming in fasting for labs on lipids, cholesterol, or glucose (sugar).  Pregnant women should follow their Care Team instructions. Water with medications is okay. Do not drink coffee or other fluids.   If you have concerns about taking  your medications, please ask at office or if scheduling via [employer], send a message by clicking on Secure Messaging, Message Your Care Team.              Who to contact     If you have questions or need follow up information about today's clinic visit or your schedule please contact [facility] directly at [phone].  Normal or non-critical lab and imaging results will be communicated to you by Company.comhart, letter or phone within 4 business days after the clinic has received the results. If you do not hear from us within 7 days, please contact the clinic through [employer] Accesst or phone. If you have a critical or abnormal lab result, we will notify you by phone as soon as possible.  Submit refill requests through [employer] or call your pharmacy and they will forward the refill request to us. Please allow 3 business days for your refill to be completed.          Additional Information About Your Visit        [email or web address] Media Information     [employer] gives you secure access to your electronic health record. If you see a primary care provider, you can also send messages to your care team and make appointments. If you have questions, please call your primary care clinic.  If you do not have a primary care provider, please call [phone] and they will assist you.        Care EveryWhere ID     This is your Care EveryWhere ID. This could be used by other organizations to access your [facility]  medical records  [id number]         Blood Pressure from Last 3 Encounters:   02/23/17 133/84   01/31/17 104/76   10/19/16 116/63    Weight from Last 3 Encounters:   02/23/17 235 lb 9.6 oz (106.9 kg)   01/31/17 240 lb (108.9 kg)   10/19/16 246 lb (111.6 kg)              Today, you had the following     No orders found for display         Today's Medication Changes          These changes are accurate as of: 3/29/17  3:53 PM.  If you have any questions, ask your nurse or doctor.               These medicines have changed or have updated prescriptions.        Dose/Directions    traZODone 100 MG tablet   Commonly known as:  DESYREL   This may have changed:    - when to take this  - reasons to take this  - additional instructions   Used for:  Insomnia, unspecified type        Dose:  100 mg   Take 1 tablet (100 mg) by mouth At Bedtime At night   Quantity:  90 tablet   Refills:  3                Primary Care Provider Office Phone # Fax #    Fariba Ding [phone]       [address] [address]        Thank you!     Thank you for choosing [facility]  for your care. Our goal is always to provide you with excellent care. Hearing back from our patients is one way we can continue to improve our services. Please take a few minutes to complete the written survey that you may receive in the mail after your visit with us. Thank you!             Your Updated Medication List - Protect others around you: Learn how to safely use, store and throw away your medicines at www.disposemymeds.org.          This list is accurate as of: 3/29/17  3:53 PM.  Always use your most recent med list.                   Brand Name Dispense Instructions for use    albuterol 108 (90 BASE) MCG/ACT Inhaler    PROAIR HFA/PROVENTIL HFA/VENTOLIN HFA    3 Inhaler    Inhale 2 puffs into the lungs every 4 hours as needed for shortness of breath / dyspnea or wheezing q 4 hours as needed        aspirin 81 MG tablet      Take by mouth daily       * atorvastatin 40 MG tablet    LIPITOR    30 tablet    Take 1 tablet (40 mg) by mouth daily       * atorvastatin 40 MG tablet    LIPITOR    90 tablet    Take 1 tablet (40 mg) by mouth daily       blood glucose monitoring lancets      as needed       clopidogrel 75 MG tablet    PLAVIX     Take 75 mg by mouth daily       fenofibrate 145 MG tablet      Take 145 mg by mouth daily       GENTLE STOOL SOFTENER PO      Take 200 mg by mouth daily       insulin aspart 100 UNIT/ML injection    NovoLOG FLEXPEN    15 mL    INJECT 10 UNITS SUBCUTANEOUSLY THREE TIMES DAILY WITH MEALS. (NEED TO BE SEEN IN CLINIC FOR FURTHER REFILLS) Refills at MD visit       insulin glargine 100 UNIT/ML injection    LANTUS SOLOSTAR    10 mL    INJECT 35 UNITS SUBCUTANEOUSLY AT BEDTIME       insulin pen needle 31G X 6 MM     100 each    Use 4 daily or as directed.       ipratropium - albuterol 0.5 mg/2.5 mg/3 mL 0.5-2.5 (3) MG/3ML neb solution    DUONEB    360 mL    Take 1 vial (3 mLs) by nebulization every 4 hours as needed for shortness of breath / dyspnea or wheezing       ledipasvir-sofosbuvir  MG per tablet   Generic drug:  ledipasvir-sofosbuvir          lisinopril 40 MG tablet    PRINIVIL/ZESTRIL    90 tablet    Take 1 tablet (40 mg) by mouth daily       nitroglycerin 0.4 MG sublingual tablet    NITROSTAT     Place 0.4 mg under the tongue as needed       omeprazole 40 MG capsule    priLOSEC    90 capsule    Take 1 capsule (40 mg) by mouth daily       traZODone 100 MG tablet    DESYREL    90 tablet    Take 1 tablet (100 mg) by mouth At Bedtime At night       * Notice:  This list has 2 medication(s) that are the same as other medications prescribed for you. Read the directions carefully, and ask your doctor or other care provider to review them with you.

## 2017-03-29 NOTE — PROGRESS NOTES
March 29, 2017    [facility]  [address]  [place] [address]  [phone]  Health Coaching Progress Note    Patient Name: Hill Ardon Date: March 29, 2017      Session Length: 30      DATA    PRM Master Survey Scores Reviewed: Yes    Core Healthy Days Survey:         JEFE Score (Last Two) 7/14/2016 7/25/2016   JEFE Raw Score 41 38   Activation Score 63.2 52.9   JEFE Level 3 2       PHQ-2 Score 1/31/2017 8/24/2016   PHQ-2 Total Score Interpretation - Positive if 3 or more points; Administer PHQ-9 if positive 0 0       No flowsheet data found.    Treatment Objective(s) Addressed in This Session:  Target Behavior(s): diet/weight loss and tobacco use:  cessation    Current Stressors / Issues:  Geoffrey shares today that he is now recertified as a a  for his grandson as his daughter is still struggling with addiction and is unable to care for him and that this has been a stressful process.    What Patient Does Well:   Geoffrey shares that he has cut way down and reports that he only had 1 cigarette this past weekend. Geoffrey plans to quit completely now.    Previous Successes:   Geoffrey states today that he has decided not to buy any more cigarettes.     Areas in Need of Improvement:   Geoffrey reports that hopefully most of the intense stress is behind him now that he is recertified to be [employer]'s  and feels this will help him stay quit.    Barriers to Change:   When stress is really intense it is hard for Geoffrey not to have a cigarette.    Reasons for Change:   Geoffrey wants to quit smoking for his health and also for financial reasons.    Plan/Goal for the Next 4 Weeks:     GOAL #1: Continue tobacco cessation-not buying any more cigarettes  GOAL #1 Progress Toward Goal: 100%  GOAL #2: Continue walking-about every other day  GOAL #2 Progress Toward Goal: 50%    Intervention:  Motivational Interviewing    MI Intervention: Expressed  Empathy/Understanding, Supported Autonomy, Collaboration, Evocation, Permission to raise concern or advise, Open-ended questions, Reflections: simple and complex, Change talk (evoked) and Reframe     Change Talk Expressed by the Patient: Desire to change Ability to change Reasons to change Committment to change Activation Taking steps    Provider Response to Change Talk: E - Evoked more info from patient about behavior change, A - Affirmed patient's thoughts, decisions, or attempts at behavior change, R - Reflected patient's change talk and S - Summarized patient's change talk statements    Assessment / Progress on Treatment Objective(s) / Homework:    Satisfactory progress - ACTION (Actively working towards change); Intervened by reinforcing change plan / affirming steps taken         Plan: (Homework, other):  Patient was encouraged to continue to seek condition-related information and education, as well as schedule a follow up appointment with the Health  in 4 weeks. Patient has set self-identified goals and will monitor progress until the next appointment.  Next visit scheduled for April 26 at 10AM.      Sam Elam

## 2017-04-07 ENCOUNTER — ALLIED HEALTH/NURSE VISIT (OUTPATIENT)
Dept: PHARMACY | Facility: CLINIC | Age: 57
End: 2017-04-07
Payer: COMMERCIAL

## 2017-04-07 DIAGNOSIS — K21.00 GASTROESOPHAGEAL REFLUX DISEASE WITH ESOPHAGITIS: ICD-10-CM

## 2017-04-07 DIAGNOSIS — E11.9 TYPE 2 DIABETES MELLITUS WITHOUT COMPLICATION, WITHOUT LONG-TERM CURRENT USE OF INSULIN (H): Primary | ICD-10-CM

## 2017-04-07 DIAGNOSIS — B18.2 CHRONIC HEPATITIS C WITHOUT HEPATIC COMA (H): ICD-10-CM

## 2017-04-07 DIAGNOSIS — F17.200 TOBACCO USE DISORDER: ICD-10-CM

## 2017-04-07 DIAGNOSIS — E11.59 TYPE 2 DIABETES MELLITUS WITH OTHER CIRCULATORY COMPLICATION, WITH LONG-TERM CURRENT USE OF INSULIN (H): ICD-10-CM

## 2017-04-07 DIAGNOSIS — Z79.4 TYPE 2 DIABETES MELLITUS WITH OTHER CIRCULATORY COMPLICATION, WITH LONG-TERM CURRENT USE OF INSULIN (H): ICD-10-CM

## 2017-04-07 PROCEDURE — 99606 MTMS BY PHARM EST 15 MIN: CPT | Performed by: PHARMACIST

## 2017-04-07 PROCEDURE — 99607 MTMS BY PHARM ADDL 15 MIN: CPT | Performed by: PHARMACIST

## 2017-04-07 RX ORDER — AZITHROMYCIN 250 MG/1
TABLET, FILM COATED ORAL
COMMUNITY
Start: 2015-04-21 | End: 2017-04-07

## 2017-04-07 RX ORDER — METFORMIN HCL 500 MG
500 TABLET, EXTENDED RELEASE 24 HR ORAL DAILY
Qty: 90 TABLET | Refills: 3 | Status: SHIPPED | OUTPATIENT
Start: 2017-04-07

## 2017-04-07 NOTE — PATIENT INSTRUCTIONS
Recommendations from today's MTM visit:                                                      1. Start Metformin ER 500mg Take 1 tablet daily. Only use the Novolog if your glucose is over 150 before a meal.     2.  Great job quitting smoking!     3. Have your blood glucose meter with you at the next Telephone visit. Great job bringing your sugars down with diet and exercise, keep it up!  Get an A1c lab the week before 5/26, so we can discuss the results.     4. Before restarting Omeprazole, I recommend trying high dose Famotidine (40mg once daily) or Ranitidine high dose (300mg once daily). We can talk about this at our next visit at I can send over a prescription for you. Omeprazole has many long term negative side effects such as increased risk of fractures, CKD, infections, and more.     Next MTM visit: 5/26 at 9 AM, have your blood glucose monitor with you!    To schedule another MTM appointment, please call the clinic directly or you may call the MTM scheduling line at [phone] or toll-free at [phone].     My Clinical Pharmacist's contact information:                                                      It was a pleasure seeing you today!  Please feel free to contact me with any questions or concerns you have.      Jose Healy, PharmDARSHANA  MTM Pharmacist    Phone: [phone]     You may receive a survey about the MTM services you received.  I would appreciate your feedback to help me serve you better in the future. Please fill it out and return it when you can. Your comments will be anonymous.

## 2017-04-07 NOTE — MR AVS SNAPSHOT
After Visit Summary   4/7/2017    Hill Ardon    MRN: [id number]           Patient Information     Date Of Birth          1960        Visit Information        Provider Department      4/7/2017 9:00 AM [facility]        Today's Diagnoses     Type 2 diabetes mellitus without complication, without long-term current use of insulin (H)    -  1    Chronic hepatitis C without hepatic coma (H)        Tobacco use disorder        Gastroesophageal reflux disease with esophagitis          Care Instructions    Recommendations from today's MTM visit:                                                      1. Start Metformin ER 500mg Take 1 tablet daily. Only use the Novolog if your glucose is over 150 before a meal.     2.  Great job quitting smoking!     3. Have your blood glucose meter with you at the next Telephone visit. Great job bringing your sugars down with diet and exercise, keep it up!  Get an A1c lab the week before 5/26, so we can discuss the results.     4. Before restarting Omeprazole, I recommend trying high dose Famotidine (40mg once daily) or Ranitidine high dose (300mg once daily). We can talk about this at our next visit at I can send over a prescription for you. Omeprazole has many long term negative side effects such as increased risk of fractures, CKD, infections, and more.     Next MTM visit: 5/26 at 9 AM, have your blood glucose monitor with you!    To schedule another MTM appointment, please call the clinic directly or you may call the MTM scheduling line at [phone] or toll-free at [phone].     My Clinical Pharmacist's contact information:                                                      It was a pleasure seeing you today!  Please feel free to contact me with any questions or concerns you have.      Jose Healy, PharmD  MTM Pharmacist    Phone: [phone]     You may receive a survey about the MTM services you  received.  I would appreciate your feedback to help me serve you better in the future. Please fill it out and return it when you can. Your comments will be anonymous.            Follow-ups after your visit        Your next 10 appointments already scheduled     Apr 20, 2017 10:00 AM CDT   LAB with UC LAB    Health Lab ([facility])    [address] [place] [id number] [phone]           Patient must bring picture ID.  Patient should be prepared to give a urine specimen  Please do not eat 10-12 hours before your appointment if you are coming in fasting for labs on lipids, cholesterol, or glucose (sugar).  Pregnant women should follow their Care Team instructions. Water with medications is okay. Do not drink coffee or other fluids.   If you have concerns about taking  your medications, please ask at office or if scheduling via Optracehart, send a message by clicking on Secure Messaging, Message Your Care Team.            Apr 26, 2017 10:00 AM CDT   Telephone Visit with HEALTH  - REGION 2   [facility] ([facility]    [address] [place] [address]44[address] [phone]           Note: this is not an onsite visit; there is no need to come to the facility.            May 26, 2017  9:00 AM CDT   TELEMEDICINE with Jose Healy [facility] Medication Therapy Management ([facility])    [address] [place] [address] [id number] [phone]           Note: this is not an onsite visit; there is no need to come to the facility.            Jul 19, 2017 10:00 AM CDT   LAB with UC LAB    Health Lab ([facility])    [address] [place] [id number] [phone]           Patient must bring picture ID.  Patient should be prepared to give a urine specimen  Please do not eat 10-12 hours before your appointment if you are coming in  fasting for labs on lipids, cholesterol, or glucose (sugar).  Pregnant women should follow their Care Team instructions. Water with medications is okay. Do not drink coffee or other fluids.   If you have concerns about taking  your medications, please ask at office or if scheduling via Voodoo[employer]ox, send a message by clicking on Secure Messaging, Message Your Care Team.              Future tests that were ordered for you today     Open Future Orders        Priority Expected Expires Ordered    Hemoglobin A1c Routine  4/7/2018 4/7/2017            Who to contact     If you have questions or need follow up information about today's clinic visit or your schedule please contact  [facility] directly at [phone].  Normal or non-critical lab and imaging results will be communicated to you by Game Play Networkhart, letter or phone within 4 business days after the clinic has received the results. If you do not hear from us within 7 days, please contact the clinic through [employer] or phone. If you have a critical or abnormal lab result, we will notify you by phone as soon as possible.  Submit refill requests through Voodoo[employer]ox or call your pharmacy and they will forward the refill request to us. Please allow 3 business days for your refill to be completed.          Additional Information About Your Visit        [employer] Information     [employer] gives you secure access to your electronic health record. If you see a primary care provider, you can also send messages to your care team and make appointments. If you have questions, please call your primary care clinic.  If you do not have a primary care provider, please call [phone] and they will assist you.        Care EveryWhere ID     This is your Care EveryWhere ID. This could be used by other organizations to access your [facility] medical records  [id number]         Blood Pressure from Last 3 Encounters:   02/23/17 133/84   01/31/17 104/76   10/19/16 116/63    Weight  from Last 3 Encounters:   02/23/17 235 lb 9.6 oz (106.9 kg)   01/31/17 240 lb (108.9 kg)   10/19/16 246 lb (111.6 kg)                 Today's Medication Changes          These changes are accurate as of: 4/7/17 10:12 AM.  If you have any questions, ask your nurse or doctor.               Start taking these medicines.        Dose/Directions    metFORMIN 500 MG 24 hr tablet   Commonly known as:  GLUCOPHAGE-XR   Used for:  Type 2 diabetes mellitus without complication, without long-term current use of insulin (H)   Started by:  Jose Healy, [facility]        Dose:  500 mg   Take 1 tablet (500 mg) by mouth daily   Quantity:  90 tablet   Refills:  3         These medicines have changed or have updated prescriptions.        Dose/Directions    insulin aspart 100 UNIT/ML injection   Commonly known as:  NovoLOG FLEXPEN   This may have changed:    - how much to take  - additional instructions   Used for:  Type 2 diabetes mellitus with other circulatory complication, with long-term current use of insulin (H)        INJECT 10 UNITS SUBCUTANEOUSLY THREE TIMES DAILY WITH MEALS. (NEED TO BE SEEN IN CLINIC FOR FURTHER REFILLS) Refills at MD visit   Quantity:  15 mL   Refills:  0       traZODone 100 MG tablet   Commonly known as:  DESYREL   This may have changed:    - when to take this  - reasons to take this  - additional instructions   Used for:  Insomnia, unspecified type        Dose:  100 mg   Take 1 tablet (100 mg) by mouth At Bedtime At night   Quantity:  90 tablet   Refills:  3            Where to get your medicines      These medications were sent to [facility] [place]  [place] [place] - [address]  [address], [place] [address]     Phone:  [phone]     metFORMIN 500 MG 24 hr tablet                Primary Care Provider Office Phone # Fax #    Fariba Ding [phone]       [facility] [address]  [place] [address]        Thank you!     Thank  you for choosing [facility] MEDICATION THERAPY MANAGEMENT  for your care. Our goal is always to provide you with excellent care. Hearing back from our patients is one way we can continue to improve our services. Please take a few minutes to complete the written survey that you may receive in the mail after your visit with us. Thank you!             Your Updated Medication List - Protect others around you: Learn how to safely use, store and throw away your medicines at www.disposemymeds.org.          This list is accurate as of: 4/7/17 10:12 AM.  Always use your most recent med list.                   Brand Name Dispense Instructions for use    albuterol 108 (90 BASE) MCG/ACT Inhaler    PROAIR HFA/PROVENTIL HFA/VENTOLIN HFA    3 Inhaler    Inhale 2 puffs into the lungs every 4 hours as needed for shortness of breath / dyspnea or wheezing q 4 hours as needed       aspirin 81 MG tablet      Take by mouth daily       atorvastatin 40 MG tablet    LIPITOR    30 tablet    Take 1 tablet (40 mg) by mouth daily       blood glucose monitoring lancets      as needed       clopidogrel 75 MG tablet    PLAVIX     Take 75 mg by mouth daily       fenofibrate 145 MG tablet      Take 145 mg by mouth daily       GENTLE STOOL SOFTENER PO      Take 200 mg by mouth daily       insulin aspart 100 UNIT/ML injection    NovoLOG FLEXPEN    15 mL    INJECT 10 UNITS SUBCUTANEOUSLY THREE TIMES DAILY WITH MEALS. (NEED TO BE SEEN IN CLINIC FOR FURTHER REFILLS) Refills at MD visit       insulin glargine 100 UNIT/ML injection    LANTUS SOLOSTAR    10 mL    INJECT 35 UNITS SUBCUTANEOUSLY AT BEDTIME       insulin pen needle 31G X 6 MM     100 each    Use 4 daily or as directed.       ipratropium - albuterol 0.5 mg/2.5 mg/3 mL 0.5-2.5 (3) MG/3ML neb solution    DUONEB    360 mL    Take 1 vial (3 mLs) by nebulization every 4 hours as needed for shortness of breath / dyspnea or wheezing       ledipasvir-sofosbuvir  MG per tablet   Generic drug:   ledipasvir-sofosbuvir          lisinopril 40 MG tablet    PRINIVIL/ZESTRIL    90 tablet    Take 1 tablet (40 mg) by mouth daily       metFORMIN 500 MG 24 hr tablet    GLUCOPHAGE-XR    90 tablet    Take 1 tablet (500 mg) by mouth daily       nitroglycerin 0.4 MG sublingual tablet    NITROSTAT     Place 0.4 mg under the tongue as needed       RA NICOTINE GUM MT          traZODone 100 MG tablet    DESYREL    90 tablet    Take 1 tablet (100 mg) by mouth At Bedtime At night

## 2017-04-07 NOTE — PROGRESS NOTES
SUBJECTIVE/OBJECTIVE:                                                    Hill Ardon is a 56 year old male called for a follow up visit for Medication Therapy Management.  He was referred to me from MARY Witt.     Chief Complaint: no concerns.     Allergies/ADRs: None  Tobacco: History of tobacco dependence - quit 2/2017 Tobacco Cessation Action Plan: Pharmacotherapies : other Nicotine replacement and cold turkey  Alcohol: history of alcohol dependence  Caffeine: 8-10 cups/day of coffee  Activity: not much  PMH: Reviewed in Epic    Medication Adherence: No issues since he met his deductible.    HCV: No side effects. No adherence issues.  He is on the last bottle.   -[facility] Use Only -   Genotype: 1a  Viral Load and date drawn: 18 million on 7/15/16  Previous treatment: Treatment naive  Liver staging: F1-2 on 9/26/16  Child Mosher score: N/A  HIV positive: No  Renal impairment CrCl <30mL/min: No  Decompensated Cirrhosis: No  Recurrent HCV post-liver transplant: No  Hepatocellular Carcinoma: No   Initial Regimen: Harvoni (ledipasvir/sofosbuvir)  Length of Therapy Ordered: 12 weeks  DDIx with HCV therapy: Omeprazole, Atorvastatin, Carvedilol.  MTM Evaluation: HCV therapy appropriate and Length of therapy appropriate    Diabetes:  Pt currently taking Novolog 8 units TID daily if over 130, was taking Lantus 38 units daily, but stopped since he ran out of Lantus. Has not taken for 2 weeks.  Denies Side effects  SMBG: three times daily.   Ranges (patient reported):  in the morning  Symptoms of low blood sugar? none. Frequency of hypoglycemia? never.  Recent symptoms of high blood sugar? none  Aspirin: Taking 81mg daily and denies side effects  Diet/Exercise: patient is on a very low carb diet, cut out especially potatoes, and walking at the mall with his son.     Smoking Cessation: Quit smoking, uses nicotine gum.  His girlfriend quit smoking, which motivated him to quit as well.  Pt has a  lot of stress right now, getting custody of his 2 year old grandson, daughter in rehab for meth addiction right now.      GERD: Current medications include: Nothing. Pt c/o heartburn after eating anything greasy, wants to go back on Omeprazole after Harvoni. Other OTC therapies, ie. Famotidine, but they were not as effective as Omeprazole.      Current labs include:  BP Readings from Last 3 Encounters:   02/23/17 133/84   01/31/17 104/76   10/19/16 116/63     Today's Vitals: There were no vitals taken for this visit.  Lab Results   Component Value Date    A1C 10.2 02/23/2017    A1C 11.8 01/31/2017    A1C 9.9 08/24/2016    A1C 9.9 07/15/2016       CHOL      239   7/15/2016  TRIG      341   7/15/2016  HDL       42   7/15/2016  LDL      129   7/15/2016    Liver Function Studies -   Recent Labs   Lab Test  08/24/16   0923   PROTTOTAL  7.4   ALBUMIN  3.6   BILITOTAL  0.4   ALKPHOS  65   AST  42   ALT  93*     Lab Results   Component Value Date    UCRR 76 07/14/2016    MICROL 8 07/14/2016    UMALCR 10.58 07/14/2016     Last Basic Metabolic Panel:  NA      134   7/15/2016   POTASSIUM      3.9   7/15/2016  CHLORIDE       97   7/15/2016  BUN       16   7/15/2016  CR     0.98   7/15/2016  GFR Estimate   Date Value Ref Range Status   02/23/2017 85 >60 mL/min/1.7m2 Final     Comment:     Non  GFR Calc   01/31/2017 >90  Non  GFR Calc   >60 mL/min/1.7m2 Final   07/15/2016 79 >60 mL/min/1.7m2 Final     Comment:     Non  GFR Calc       TSH   Date Value Ref Range Status   07/15/2016 3.22 0.40 - 4.00 mU/L Final   ]    Most Recent Immunizations   Administered Date(s) Administered     Influenza Vaccine IM 3yrs+ 4 Valent IIV4 01/31/2017     Pneumococcal 23 valent 06/30/2015     TD (ADULT, 7+) 04/22/2007     TDAP Vaccine (Adacel) 01/31/2017     ASSESSMENT:                                                       Current medications were reviewed today.     Medication Adherence: No  issues    HCV: Stable.On last month of therapy.   Follow up on DDIx:    -Omeprazole- Pt has D/C'd Omeprazole before starting Harvoni, no issues   -Atorvastatin- Moderate interaction, Harvoni may increase Atorvastatin levels, pt to monitor for myalgias. No issues   -Carvedilol- Moderate interaction. Pt to monitor for exercise fatigue, weakness, dizziness. Harvoni may increase Carvedilol levels. No issues    Diabetes: Needs Improvement. Patient is not meeting A1c goal of < 7%. Majority of BG are well within goal per patient report. Insulin likely isn't needed at this point, I will start Metformin ER 500mg once daily  And continue holding Lantus and start holding Novolog. Surprised his BG is so low, despite recent A1c being ~10. Will check A1c again before next visit.     Smoking Cessation: Resolved, Pt quit smoking.     GERD: Discussed with patient trying Famotidine or Ranitidine again before going back Omeprazole, as it has more long term negative effects.        PLAN:                                                      Pt to...    1. Start Metformin ER 500mg Take 1 tablet daily. Only use the Novolog if your glucose is over 150 before a meal.     2.  Great job quitting smoking!     3. Have your blood glucose meter with you at the next Telephone visit. Great job bringing your sugars down with diet and exercise, keep it up!  Get an A1c lab the week before 5/26, so we can discuss the results.     I spent 30 minutes with this patient today.  All changes were made via collaborative practice agreement with Fariba Ding. A copy of the visit note was provided to the patient's primary care provider.    Will follow up in 1.5 months.     The patient was sent via "[employer] a summary of these recommendations as an after visit summary.     Jose Healy, PharmD  MTM Pharmacist    Phone: [phone]

## 2017-04-07 NOTE — Clinical Note
Per patient report blood sugars are between . Starting 500mg of Metformin and will check A1c in a month.

## 2017-04-20 DIAGNOSIS — B18.2 CHRONIC HEPATITIS C WITHOUT HEPATIC COMA (H): ICD-10-CM

## 2017-04-24 LAB
HCV RNA SERPL NAA+PROBE-ACNC: NORMAL [IU]/ML
HCV RNA SERPL NAA+PROBE-LOG IU: NORMAL LOG IU/ML

## 2017-04-28 ENCOUNTER — VIRTUAL VISIT (OUTPATIENT)
Dept: NURSING | Facility: CLINIC | Age: 57
End: 2017-04-28

## 2017-04-28 DIAGNOSIS — E11.59 TYPE 2 DIABETES MELLITUS WITH OTHER CIRCULATORY COMPLICATIONS (H): ICD-10-CM

## 2017-04-28 DIAGNOSIS — E66.01 MORBID OBESITY DUE TO EXCESS CALORIES (H): Primary | ICD-10-CM

## 2017-04-28 PROCEDURE — 99207 ZZC HEALTH COACHING, NO CHARGE: CPT

## 2017-04-28 NOTE — PROGRESS NOTES
April 28, 2017    [facility]  [address]  [place] [address]  [phone]  Health Coaching Progress Note    Patient Name: Hill Ardon Date: April 28, 2017      Session Length: 30      DATA    PRM Master Survey Scores Reviewed: Yes    Core Healthy Days Survey:         JEFE Score (Last Two) 7/14/2016 7/25/2016   JEFE Raw Score 41 38   Activation Score 63.2 52.9   JEFE Level 3 2       PHQ-2 Score 1/31/2017 8/24/2016   PHQ-2 Total Score Interpretation - Positive if 3 or more points; Administer PHQ-9 if positive 0 0       No flowsheet data found.    Treatment Objective(s) Addressed in This Session:  Target Behavior(s): diet/weight loss and tobacco use:  cessation    Current Stressors / Issues:  Geoffrey notes that things continue to be stressful for him in regard to his grandson Anival and that he is currently hoping his daughter will sign custody over to him so that he can continue to care for him.    What Patient Does Well:   Geoffrey continues to work on his goals of tobacco cessation and weight/diabetes management.    Previous Successes:   Geoffrey reports today that he quit smoking completely in April after we talked last (3/29/17).    Areas in Need of Improvement:   Geoffrey also states today that he continues to work on improving his BG levels/H1hBphmq says that he has cut out bread, potatoes, and soda and that this has made a big difference.    Geoffrey also shares that he has had some medication changes for his diabetes and says that he went back on Metformin and then experienced a big BG spike (reports 307). Geoffrey reports he went back to managing his BG levels with insulin.    Barriers to Change:   Geoffrey notes no barriers to change today.    Reasons for Change:   Geoffrey wants to quit smoking for his health and his grandson.    Plan/Goal for the Next 4 Weeks:     GOAL #1: Continue tobacco cessation  GOAL #1 Progress Toward Goal: 100%  GOAL #2: Continue  walking-about every other day  GOAL #2 Progress Toward Goal: 75%    Intervention:  Motivational Interviewing    MI Intervention: Expressed Empathy/Understanding, Supported Autonomy, Collaboration, Evocation, Permission to raise concern or advise, Open-ended questions, Reflections: simple and complex, Change talk (evoked) and Reframe     Change Talk Expressed by the Patient: Desire to change Ability to change Reasons to change Need to change Committment to change Activation Taking steps    Provider Response to Change Talk: E - Evoked more info from patient about behavior change, A - Affirmed patient's thoughts, decisions, or attempts at behavior change, R - Reflected patient's change talk and S - Summarized patient's change talk statements    Assessment / Progress on Treatment Objective(s) / Homework:    Satisfactory progress - ACTION (Actively working towards change); Intervened by reinforcing change plan / affirming steps taken         Plan: (Homework, other):  Patient was encouraged to continue to seek condition-related information and education, as well as schedule a follow up appointment with the Health  in 4 weeks. Patient has set self-identified goals and will monitor progress until the next appointment.  Next visit scheduled for May 25 at 11AM.      Sam Elam

## 2017-05-24 ENCOUNTER — VIRTUAL VISIT (OUTPATIENT)
Dept: NURSING | Facility: CLINIC | Age: 57
End: 2017-05-24

## 2017-05-24 DIAGNOSIS — E66.01 MORBID OBESITY DUE TO EXCESS CALORIES (H): ICD-10-CM

## 2017-05-24 DIAGNOSIS — E11.59 TYPE 2 DIABETES MELLITUS WITH OTHER CIRCULATORY COMPLICATIONS (H): Primary | ICD-10-CM

## 2017-05-24 PROCEDURE — 99207 ZZC HEALTH COACHING, NO CHARGE: CPT

## 2017-05-24 NOTE — Clinical Note
Hi Dr. Ding,  I am just routing this note to let you know that Geoffrey has quit smoking! I will follow up with him again in 6 months to see if he is still smoke-free! Thank you for allowing me to participate in this patient's care.   Health & Happiness, Sam Elam [facility]

## 2017-05-24 NOTE — PROGRESS NOTES
"May 24, 2017    [facility]  [address]  [place] [address] [phone]  Health Coaching Progress Note    Patient Name: Hill Ardon Date: May 24, 2017      Session Length: 30      DATA    PRM Master Survey Scores Reviewed: Yes    Core Healthy Days Survey:         JEFE Score (Last Two) 7/14/2016 7/25/2016   JEFE Raw Score 41 38   Activation Score 63.2 52.9   JEFE Level 3 2       PHQ-2 Score 1/31/2017 8/24/2016   PHQ-2 Total Score Interpretation - Positive if 3 or more points; Administer PHQ-9 if positive 0 0       No flowsheet data found.    Treatment Objective(s) Addressed in This Session:  Target Behavior(s): diet/weight loss and tobacco use:  cessation    Current Stressors / Issues:  Geoffrey notes that the day started out stressful as he woke late and had an appointment to be at for his mom. Geoffrey notes no other stressors other than \"the usual stuff\".    What Patient Does Well:   Geoffrey is doing well over all and is really happy about the results from his Harvoni treatments.    Previous Successes:   Geoffrey reports that he is still a non-smoker. Geoffrey states that he doesn't even really think about it very often now.    Areas in Need of Improvement:   Geoffrey notes no new areas in need of improvement at this time and just plans to keep working on things (diet/exercise).     Barriers to Change:   Geoffrey notes no barriers at this time.    Reasons for Change:   Geoffrey wants to make changes for his health and so that he is able to care for his grandson Anival.    Plan/Goal for the Next 4 Weeks:     GOAL #1: Continue tobacco cessation  GOAL #1 Progress Toward Goal: 100%  GOAL #2: Continue walking-about every other day  GOAL #2 Progress Toward Goal: 100%    Intervention:  Motivational Interviewing    MI Intervention: Expressed Empathy/Understanding, Supported Autonomy, Collaboration, Evocation, Permission to raise concern or advise, Open-ended questions, " Reflections: simple and complex, Change talk (evoked) and Reframe     Change Talk Expressed by the Patient: Desire to change Ability to change Reasons to change Committment to change Activation Taking steps    Provider Response to Change Talk: E - Evoked more info from patient about behavior change, A - Affirmed patient's thoughts, decisions, or attempts at behavior change, R - Reflected patient's change talk and S - Summarized patient's change talk statements    Assessment / Progress on Treatment Objective(s) / Homework:    Achieved / completed to satisfaction - MAINTENANCE (Working to maintain change, with risk of relapse); Intervened by continuing to positively reinforce healthy behavior choice          Plan: (Homework, other):  Patient was encouraged to continue to seek condition-related information and education, as well as schedule a follow up appointment with the Health  in 6 months. Patient has set self-identified goals and will monitor progress until the next appointment.  Next visit scheduled for November 20 11AM.      Sam Elam        Core Healthy Days Survey:    Would you say that in general your health is: : Very Good    JEFE Score (Last Two) 7/25/2016 5/24/2017   JEFE Raw Score 38 40   Activation Score 52.9 100   JEFE Level 2 4       PHQ-2 Score 5/24/2017 1/31/2017   PHQ-2 Total Score Interpretation - Positive if 3 or more points; Administer PHQ-9 if positive 0 0       No flowsheet data found.

## 2017-05-24 NOTE — MR AVS SNAPSHOT
After Visit Summary   5/24/2017    Hill Ardon    MRN: [id number]           Patient Information     Date Of Birth          1960        Visit Information        Provider Department      5/24/2017 11:00 AM HEALTH  - [address]        Today's Diagnoses     Type 2 diabetes mellitus with other circulatory complications (H)    -  1    Morbid obesity due to excess calories (H)           Follow-ups after your visit        Your next 10 appointments already scheduled     May 26, 2017  9:00 AM CDT   [facility]    [address] [place] [id number] [phone]           Note: this is not an onsite visit; there is no need to come to the facility.            Jul 19, 2017 10:00 AM CDT   LAB with  LAB   [facility] ([facility])    [address] [place] [address] [id number] [phone]           Patient must bring picture ID.  Patient should be prepared to give a urine specimen  Please do not eat 10-12 hours before your appointment if you are coming in fasting for labs on lipids, cholesterol, or glucose (sugar).  Pregnant women should follow their Care Team instructions. Water with medications is okay. Do not drink coffee or other fluids.   If you have concerns about taking  your medications, please ask at office or if scheduling via [employer], send a message by clicking on Secure Messaging, Message Your Care Team.            Nov 20, 2017 11:00 AM CST   Telephone Visit with HEALTH  [address] [place] ([facility])    [address] [facility] [address] [phone]           Note: this is not an onsite visit; there is no need to come to the facility.              Who to contact     If you have questions or need follow up information about today's clinic  visit or your schedule please contact [facility] directly at [phone].  Normal or non-critical lab and imaging results will be communicated to you by MyChart, letter or phone within 4 business days after the clinic has received the results. If you do not hear from us within 7 days, please contact the clinic through [employer] or phone. If you have a critical or abnormal lab result, we will notify you by phone as soon as possible.  Submit refill requests through [employer] or call your pharmacy and they will forward the refill request to us. Please allow 3 business days for your refill to be completed.          Additional Information About Your Visit        [employer] Information     [employer] gives you secure access to your electronic health record. If you see a primary care provider, you can also send messages to your care team and make appointments. If you have questions, please call your primary care clinic.  If you do not have a primary care provider, please call [phone] and they will assist you.        Care EveryWhere ID     This is your Care EveryWhere ID. This could be used by other organizations to access your [place] medical records  [id number]         Blood Pressure from Last 3 Encounters:   02/23/17 133/84   01/31/17 104/76   10/19/16 116/63    Weight from Last 3 Encounters:   02/23/17 235 lb 9.6 oz (106.9 kg)   01/31/17 240 lb (108.9 kg)   10/19/16 246 lb (111.6 kg)              Today, you had the following     No orders found for display         Today's Medication Changes          These changes are accurate as of: 5/24/17  4:22 PM.  If you have any questions, ask your nurse or doctor.               These medicines have changed or have updated prescriptions.        Dose/Directions    insulin aspart 100 UNIT/ML injection   Commonly known as:  NovoLOG FLEXPEN   This may have changed:    - how much to take  - additional instructions   Used for:  Type 2 diabetes mellitus with other circulatory  complication, with long-term current use of insulin (H)        INJECT 10 UNITS SUBCUTANEOUSLY THREE TIMES DAILY WITH MEALS. (NEED TO BE SEEN IN CLINIC FOR FURTHER REFILLS) Refills at MD visit   Quantity:  15 mL   Refills:  0       traZODone 100 MG tablet   Commonly known as:  DESYREL   This may have changed:    - when to take this  - reasons to take this  - additional instructions   Used for:  Insomnia, unspecified type        Dose:  100 mg   Take 1 tablet (100 mg) by mouth At Bedtime At night   Quantity:  90 tablet   Refills:  3                Primary Care Provider Office Phone # Fax #    Fariba Ding [phone]       [address] [address]        Thank you!     Thank you for choosing [facility]  for your care. Our goal is always to provide you with excellent care. Hearing back from our patients is one way we can continue to improve our services. Please take a few minutes to complete the written survey that you may receive in the mail after your visit with us. Thank you!             Your Updated Medication List - Protect others around you: Learn how to safely use, store and throw away your medicines at www.disposemymeds.org.          This list is accurate as of: 5/24/17  4:22 PM.  Always use your most recent med list.                   Brand Name Dispense Instructions for use    albuterol 108 (90 BASE) MCG/ACT Inhaler    PROAIR HFA/PROVENTIL HFA/VENTOLIN HFA    3 Inhaler    Inhale 2 puffs into the lungs every 4 hours as needed for shortness of breath / dyspnea or wheezing q 4 hours as needed       aspirin 81 MG tablet      Take 81 mg by mouth daily       atorvastatin 40 MG tablet    LIPITOR    30 tablet    Take 1 tablet (40 mg) by mouth daily       blood glucose monitoring lancets      as needed Reported on 4/7/2017       clopidogrel 75 MG tablet    PLAVIX     Take 75 mg by mouth daily       fenofibrate 145 MG tablet      Take 145 mg by mouth  daily       GENTLE STOOL SOFTENER PO      Take 200 mg by mouth daily       insulin aspart 100 UNIT/ML injection    NovoLOG FLEXPEN    15 mL    INJECT 10 UNITS SUBCUTANEOUSLY THREE TIMES DAILY WITH MEALS. (NEED TO BE SEEN IN CLINIC FOR FURTHER REFILLS) Refills at MD visit       insulin glargine 100 UNIT/ML injection    LANTUS SOLOSTAR    10 mL    INJECT 35 UNITS SUBCUTANEOUSLY AT BEDTIME       insulin pen needle 31G X 6 MM     100 each    Use 4 daily or as directed.       ipratropium - albuterol 0.5 mg/2.5 mg/3 mL 0.5-2.5 (3) MG/3ML neb solution    DUONEB    360 mL    Take 1 vial (3 mLs) by nebulization every 4 hours as needed for shortness of breath / dyspnea or wheezing       ledipasvir-sofosbuvir  MG per tablet   Generic drug:  ledipasvir-sofosbuvir          lisinopril 40 MG tablet    PRINIVIL/ZESTRIL    90 tablet    Take 1 tablet (40 mg) by mouth daily       metFORMIN 500 MG 24 hr tablet    GLUCOPHAGE-XR    90 tablet    Take 1 tablet (500 mg) by mouth daily       nitroglycerin 0.4 MG sublingual tablet    NITROSTAT     Place 0.4 mg under the tongue as needed       RA NICOTINE GUM MT          traZODone 100 MG tablet    DESYREL    90 tablet    Take 1 tablet (100 mg) by mouth At Bedtime At night

## 2017-05-30 ENCOUNTER — MYC MEDICAL ADVICE (OUTPATIENT)
Dept: PHARMACY | Facility: CLINIC | Age: 57
End: 2017-05-30

## 2017-07-19 DIAGNOSIS — E11.9 TYPE 2 DIABETES MELLITUS WITHOUT COMPLICATION, WITHOUT LONG-TERM CURRENT USE OF INSULIN (H): ICD-10-CM

## 2017-07-19 DIAGNOSIS — B18.2 CHRONIC HEPATITIS C WITHOUT HEPATIC COMA (H): ICD-10-CM

## 2017-07-19 LAB — HBA1C MFR BLD: 9.4 % (ref 4.3–6)

## 2017-07-20 LAB
HCV RNA SERPL NAA+PROBE-ACNC: NORMAL [IU]/ML
HCV RNA SERPL NAA+PROBE-LOG IU: NORMAL LOG IU/ML

## 2017-08-01 ENCOUNTER — MYC MEDICAL ADVICE (OUTPATIENT)
Dept: PHARMACY | Facility: CLINIC | Age: 57
End: 2017-08-01

## 2017-08-11 ENCOUNTER — TELEPHONE (OUTPATIENT)
Dept: FAMILY MEDICINE | Facility: CLINIC | Age: 57
End: 2017-08-11

## 2017-08-11 NOTE — TELEPHONE ENCOUNTER
Panel Management Review      Patient has the following on his problem list:     Diabetes    ASA: Passed    Last A1C  Lab Results   Component Value Date    A1C 9.4 07/19/2017    A1C 10.2 02/23/2017    A1C 11.8 01/31/2017    A1C 9.9 08/24/2016    A1C 9.9 07/15/2016     A1C tested: FAILED    Last LDL:    Lab Results   Component Value Date    CHOL 235 01/31/2017     Lab Results   Component Value Date    HDL 37 01/31/2017     Lab Results   Component Value Date     01/31/2017     Lab Results   Component Value Date    TRIG 343 01/31/2017     No results found for: CHOLHDLRATIO  Lab Results   Component Value Date    NHDL 198 01/31/2017       Is the patient on a Statin? YES             Is the patient on Aspirin? YES    Medications     HMG CoA Reductase Inhibitors    atorvastatin (LIPITOR) 40 MG tablet    Salicylates    aspirin 81 MG tablet          Last three blood pressure readings:  BP Readings from Last 3 Encounters:   02/23/17 133/84   01/31/17 104/76   10/19/16 116/63       Date of last diabetes office visit: 07/19/2017     Tobacco History:     History   Smoking Status     Former Smoker     Types: Cigarettes     Quit date: 4/10/2017   Smokeless Tobacco     Never Used     Comment: quit cold turkey-some gum-regular and nicotine             Composite cancer screening  Chart review shows that this patient is due/due soon for the following None  Summary:    Patient is due/failing the following:   A1C    Action needed:   Routed to provider for review.    Type of outreach:    None, routed to provider for review.    Questions for provider review:    Patient was recently seen please advise                                                                                                                                    Chevy Forman       Chart routed to Provider .

## 2017-08-14 NOTE — TELEPHONE ENCOUNTER
Spoke to patient let him know that Dr Ding wanted him to schedule appt for his Diabete. Schedule appointment with Dr Ding on 9/13/17@9:00am    Marina Elliott. MA

## 2017-09-14 ENCOUNTER — OFFICE VISIT (OUTPATIENT)
Dept: FAMILY MEDICINE | Facility: CLINIC | Age: 57
End: 2017-09-14
Payer: COMMERCIAL

## 2017-09-14 VITALS
WEIGHT: 220 LBS | RESPIRATION RATE: 14 BRPM | TEMPERATURE: 98.2 F | HEART RATE: 89 BPM | HEIGHT: 69 IN | SYSTOLIC BLOOD PRESSURE: 124 MMHG | BODY MASS INDEX: 32.58 KG/M2 | OXYGEN SATURATION: 96 % | DIASTOLIC BLOOD PRESSURE: 72 MMHG

## 2017-09-14 DIAGNOSIS — I25.10 CORONARY ARTERY DISEASE INVOLVING NATIVE HEART WITHOUT ANGINA PECTORIS, UNSPECIFIED VESSEL OR LESION TYPE: ICD-10-CM

## 2017-09-14 DIAGNOSIS — Z23 NEED FOR PROPHYLACTIC VACCINATION AND INOCULATION AGAINST INFLUENZA: ICD-10-CM

## 2017-09-14 DIAGNOSIS — Z12.11 SCREEN FOR COLON CANCER: ICD-10-CM

## 2017-09-14 DIAGNOSIS — L08.9 LOCAL INFECTION OF SKIN AND SUBCUTANEOUS TISSUE: ICD-10-CM

## 2017-09-14 DIAGNOSIS — D12.6 ADENOMATOUS POLYP OF COLON, UNSPECIFIED PART OF COLON: Primary | ICD-10-CM

## 2017-09-14 DIAGNOSIS — E66.01 MORBID OBESITY DUE TO EXCESS CALORIES (H): ICD-10-CM

## 2017-09-14 DIAGNOSIS — F17.200 CURRENT SMOKER: ICD-10-CM

## 2017-09-14 DIAGNOSIS — I10 ESSENTIAL HYPERTENSION WITH GOAL BLOOD PRESSURE LESS THAN 140/90: ICD-10-CM

## 2017-09-14 DIAGNOSIS — E11.42 DIABETIC POLYNEUROPATHY ASSOCIATED WITH TYPE 2 DIABETES MELLITUS (H): ICD-10-CM

## 2017-09-14 LAB
ALBUMIN SERPL-MCNC: 3.5 G/DL (ref 3.4–5)
ALP SERPL-CCNC: 86 U/L (ref 40–150)
ALT SERPL W P-5'-P-CCNC: 23 U/L (ref 0–70)
ANION GAP SERPL CALCULATED.3IONS-SCNC: 9 MMOL/L (ref 3–14)
AST SERPL W P-5'-P-CCNC: 11 U/L (ref 0–45)
BILIRUB DIRECT SERPL-MCNC: <0.1 MG/DL (ref 0–0.2)
BILIRUB SERPL-MCNC: 0.3 MG/DL (ref 0.2–1.3)
BUN SERPL-MCNC: 15 MG/DL (ref 7–30)
CALCIUM SERPL-MCNC: 9.1 MG/DL (ref 8.5–10.1)
CHLORIDE SERPL-SCNC: 103 MMOL/L (ref 94–109)
CO2 SERPL-SCNC: 26 MMOL/L (ref 20–32)
CREAT SERPL-MCNC: 0.75 MG/DL (ref 0.66–1.25)
CREAT UR-MCNC: 42 MG/DL
GFR SERPL CREATININE-BSD FRML MDRD: >90 ML/MIN/1.7M2
GLUCOSE SERPL-MCNC: 242 MG/DL (ref 70–99)
HBA1C MFR BLD: 10.7 % (ref 4.3–6)
MICROALBUMIN UR-MCNC: 32 MG/L
MICROALBUMIN/CREAT UR: 77.57 MG/G CR (ref 0–17)
POTASSIUM SERPL-SCNC: 4 MMOL/L (ref 3.4–5.3)
PROT SERPL-MCNC: 7.2 G/DL (ref 6.8–8.8)
SODIUM SERPL-SCNC: 138 MMOL/L (ref 133–144)

## 2017-09-14 PROCEDURE — 80076 HEPATIC FUNCTION PANEL: CPT | Performed by: FAMILY MEDICINE

## 2017-09-14 PROCEDURE — 90686 IIV4 VACC NO PRSV 0.5 ML IM: CPT | Performed by: FAMILY MEDICINE

## 2017-09-14 PROCEDURE — 36415 COLL VENOUS BLD VENIPUNCTURE: CPT | Performed by: FAMILY MEDICINE

## 2017-09-14 PROCEDURE — 99214 OFFICE O/P EST MOD 30 MIN: CPT | Mod: 25 | Performed by: FAMILY MEDICINE

## 2017-09-14 PROCEDURE — 90471 IMMUNIZATION ADMIN: CPT | Performed by: FAMILY MEDICINE

## 2017-09-14 PROCEDURE — 80048 BASIC METABOLIC PNL TOTAL CA: CPT | Performed by: FAMILY MEDICINE

## 2017-09-14 PROCEDURE — 83036 HEMOGLOBIN GLYCOSYLATED A1C: CPT | Performed by: FAMILY MEDICINE

## 2017-09-14 PROCEDURE — 82043 UR ALBUMIN QUANTITATIVE: CPT | Performed by: FAMILY MEDICINE

## 2017-09-14 RX ORDER — CLOPIDOGREL BISULFATE 75 MG/1
75 TABLET ORAL DAILY
Qty: 30 TABLET | Status: CANCELLED | OUTPATIENT
Start: 2017-09-14

## 2017-09-14 RX ORDER — CEPHALEXIN 500 MG/1
500 CAPSULE ORAL 3 TIMES DAILY
Qty: 30 CAPSULE | Refills: 0 | Status: SHIPPED | OUTPATIENT
Start: 2017-09-14

## 2017-09-14 NOTE — PROGRESS NOTES
Injectable Influenza Immunization Documentation    1.  Are you sick today? (Fever of 100.5 or higher on the day of the clinic)   No    2.  Have you ever had Guillain-Washington Syndrome within 6 weeks of an influenza vaccionation?  No    3. Do you have a life-threatening allergy to eggs?  No    4. Do you have a life-threatening allergy to a component of the vaccine? May include antibiotics, gelatin or latex.  No     5. Have you ever had a reaction to a dose of flu vaccine that needed immediate medical attention?  No     Form completed by patient

## 2017-09-14 NOTE — PATIENT INSTRUCTIONS
[facility]    If you have any questions regarding to your visit please contact your care team:       Team Red:   Clinic Hours Telephone Number   Dr. Geena Pemberton  (pediatrics)  Kelley Butler NP 7am-7pm  Monday - Thursday   7am-5pm  Fridays  [phone] (fax)    [facility]  [phone]   Urgent Care - [place] Monday-Friday  [place] - 11am-8pm  Saturday-Sunday  Both sites - 9am-5pm  [phone] - [facility]  [phone] - [place]       What options do I have for visits at the clinic other than the traditional office visit?  To expand how we care for you, many of our providers are utilizing electronic visits (e-visits) and telephone visits, when medically appropriate, for interactions with their patients rather than a visit in the clinic.   We also offer nurse visits for many medical concerns. Just like any other service, we will bill your insurance company for this type of visit based on time spent on the phone with your provider. Not all insurance companies cover these visits. Please check with your medical insurance if this type of visit is covered. You will be responsible for any charges that are not paid by your insurance.      E-visits via [employer]:  generally incur a $35.00 fee.  Telephone visits:  Time spent on the phone: *charged based on time that is spent on the phone in increments of 10 minutes. Estimated cost:   5-10 mins $30.00   11-20 mins. $59.00   21-30 mins. $85.00     As always, Thank you for trusting us with your health care needs!            Discharged by Kayleigh DOE CMA ([facility])

## 2017-09-14 NOTE — MR AVS SNAPSHOT
After Visit Summary   9/14/2017    Hill Ardon    MRN: [id number]           Patient Information     Date Of Birth          1960        Visit Information        Provider Department      9/14/2017 7:20 AM Fariba Ding MD [facility]        Today's Diagnoses     Type 2 diabetes mellitus with other circulatory complications (H)    -  1    Screen for colon cancer        Need for prophylactic vaccination and inoculation against influenza        Type 2 diabetes mellitus with other circulatory complication, with long-term current use of insulin (H)        Adenomatous polyp of colon, unspecified part of colon        Current smoker        Diabetic polyneuropathy associated with type 2 diabetes mellitus (H)        Essential hypertension with goal blood pressure less than 140/90        Morbid obesity due to excess calories (H)        Coronary artery disease involving native heart without angina pectoris, unspecified vessel or lesion type        Local infection of skin and subcutaneous tissue          Care Instructions    [facility]    If you have any questions regarding to your visit please contact your care team:       Team Red:   Clinic Hours Telephone Number   Dr. Geena Pemberton  (pediatrics)  Kelley Butler NP 7am-7pm  Monday - Thursday   7am-5pm  Fridays  [phone] (fax)    [facility]  [phone]   Urgent Care - [place] and [place] Monday-Friday  [place] - 11am-8pm  Saturday-Sunday  Both sites - 9am-5pm  [phone] - [facility]  [phone] - [place]       What options do I have for visits at the clinic other than the traditional office visit?  To expand how we care for you, many of our providers are utilizing electronic visits (e-visits) and telephone visits, when medically appropriate, for interactions with their patients rather than a visit in the clinic.   We also offer nurse visits for many  medical concerns. Just like any other service, we will bill your insurance company for this type of visit based on time spent on the phone with your provider. Not all insurance companies cover these visits. Please check with your medical insurance if this type of visit is covered. You will be responsible for any charges that are not paid by your insurance.      E-visits via [employer]  generally incur a $35.00 fee.  Telephone visits:  Time spent on the phone: *charged based on time that is spent on the phone in increments of 10 minutes. Estimated cost:   5-10 mins $30.00   11-20 mins. $59.00   21-30 mins. $85.00     As always, Thank you for trusting us with your health care needs!            Discharged by Kayleigh DOE CMA ([facility])            Follow-ups after your visit        Additional Services     GASTROENTEROLOGY ADULT REF PROCEDURE ONLY       Last Lab Result: Creatinine (mg/dL)       Date                     Value                 02/23/2017               0.91             ----------  Body mass index is 32.49 kg/(m^2).     Needed:  No  Language:  English    Patient will be contacted to schedule procedure.     Please be aware that coverage of these services is subject to the terms and limitations of your health insurance plan.  Call member services at your health plan with any benefit or coverage questions.  Any procedures must be performed at a [facility] facility OR coordinated by your clinic's referral office.    Please bring the following with you to your appointment:    (1) Any X-Rays, CTs or MRIs which have been performed.  Contact the facility where they were done to arrange for  prior to your scheduled appointment.    (2) List of current medications   (3) This referral request   (4) Any documents/labs given to you for this referral            OPHTHALMOLOGY ADULT REFERRAL       Your provider has referred you to: [facility]: [facility] [phone]    [email or web address]    Please be aware that coverage of these services is subject to the terms and limitations of your health insurance plan.  Call member services at your health plan with any benefit or coverage questions.      Please bring the following with you to your appointment:    (1) Any X-Rays, CTs or MRIs which have been performed.  Contact the facility where they were done to arrange for  prior to your scheduled appointment.    (2) List of current medications  (3) This referral request   (4) Any documents/labs given to you for this referral                  Your next 10 appointments already scheduled     Nov 20, 2017 11:00 AM CST   Telephone Visit with HEALTH  - [facility] 2   [facility] [place] ([facility])    [address]  [place] [address] [phone]           Note: this is not an onsite visit; there is no need to come to the facility.              Who to contact     If you have questions or need follow up information about today's clinic visit or your schedule please contact [facility] directly at [phone].  Normal or non-critical lab and imaging results will be communicated to you by MyChart, letter or phone within 4 business days after the clinic has received the results. If you do not hear from us within 7 days, please contact the clinic through [employer] or phone. If you have a critical or abnormal lab result, we will notify you by phone as soon as possible.  Submit refill requests through [employer] or call your pharmacy and they will forward the refill request to us. Please allow 3 business days for your refill to be completed.          Additional Information About Your Visit        [employer] Information     [employer] gives you secure access to your electronic health record. If you see a primary care provider, you can also send messages to your care team and make appointments. If you have questions, please call your  "primary care clinic.  If you do not have a primary care provider, please call [phone] and they will assist you.        Care EveryWhere ID     This is your Care EveryWhere ID. This could be used by other organizations to access your [place] medical records  [id number]        Your Vitals Were     Pulse Temperature Respirations Height Pulse Oximetry BMI (Body Mass Index)    89 98.2  F (36.8  C) 14 5' 9\" (1.753 m) 96% 32.49 kg/m2       Blood Pressure from Last 3 Encounters:   09/14/17 124/72   02/23/17 133/84   01/31/17 104/76    Weight from Last 3 Encounters:   09/14/17 220 lb (99.8 kg)   02/23/17 235 lb 9.6 oz (106.9 kg)   01/31/17 240 lb (108.9 kg)              We Performed the Following     Albumin Random Urine Quantitative with Creat Ratio     BASIC METABOLIC PANEL     GASTROENTEROLOGY ADULT REF PROCEDURE ONLY     Hemoglobin A1c     HEPATIC PANEL     OPHTHALMOLOGY ADULT REFERRAL          Today's Medication Changes          These changes are accurate as of: 9/14/17  8:26 AM.  If you have any questions, ask your nurse or doctor.               Start taking these medicines.        Dose/Directions    cephALEXin 500 MG capsule   Commonly known as:  KEFLEX   Used for:  Local infection of skin and subcutaneous tissue   Started by:  Fariba Ding MD        Dose:  500 mg   Take 1 capsule (500 mg) by mouth 3 times daily   Quantity:  30 capsule   Refills:  0         These medicines have changed or have updated prescriptions.        Dose/Directions    insulin aspart 100 UNIT/ML injection   Commonly known as:  NovoLOG FLEXPEN   This may have changed:    - how much to take  - additional instructions   Used for:  Type 2 diabetes mellitus with other circulatory complication, with long-term current use of insulin (H)        INJECT 10 UNITS SUBCUTANEOUSLY THREE TIMES DAILY WITH MEALS. (NEED TO BE SEEN IN CLINIC FOR FURTHER REFILLS) Refills at MD visit   Quantity:  15 mL   Refills:  0       traZODone 100 MG tablet   Commonly " known as:  TOMMY   This may have changed:    - when to take this  - reasons to take this  - additional instructions   Used for:  Insomnia, unspecified type        Dose:  100 mg   Take 1 tablet (100 mg) by mouth At Bedtime At night   Quantity:  90 tablet   Refills:  3         Stop taking these medicines if you haven't already. Please contact your care team if you have questions.     clopidogrel 75 MG tablet   Commonly known as:  PLAVIX   Stopped by:  Fariba Ding MD                Where to get your medicines      These medications were sent to [facility] [place] - [place] - [address]  [address], [place] [address]     Phone:  [phone]     cephALEXin 500 MG capsule    insulin glargine 100 UNIT/ML injection         Some of these will need a paper prescription and others can be bought over the counter.  Ask your nurse if you have questions.     Bring a paper prescription for each of these medications     insulin aspart 100 UNIT/ML injection                Primary Care Provider Office Phone # Fax #    Fariba Ding [phone] [address] [address]        Equal Access to Services     [facility] : Hadii judi graves [place], waaxda luqadaha, qaybta kaalpaige mckenna, george spain. So [facility] [phone].    ATENCIÓN: Si habla español, tiene a noble disposición servicios gratnyaos de asistencia lingüística. [facility] [phone].    We comply with applicable federal civil rights laws and [place] laws. We do not discriminate on the basis of race, color, national origin, age, disability sex, sexual orientation or gender identity.            Thank you!     [facility]  [facility]. Our goal is always to provide you with excellent care. Hearing back from our patients is one way we can continue to improve our services. Please take a few minutes to complete the written  survey that you may receive in the mail after your visit with us. Thank you!             Your Updated Medication List - Protect others around you: Learn how to safely use, store and throw away your medicines at www.disposemymeds.org.          This list is accurate as of: 9/14/17  8:26 AM.  Always use your most recent med list.                   Brand Name Dispense Instructions for use Diagnosis    albuterol 108 (90 BASE) MCG/ACT Inhaler    PROAIR HFA/PROVENTIL HFA/VENTOLIN HFA    3 Inhaler    Inhale 2 puffs into the lungs every 4 hours as needed for shortness of breath / dyspnea or wheezing q 4 hours as needed    Chronic obstructive pulmonary disease, unspecified COPD type (H)       aspirin 81 MG tablet      Take 81 mg by mouth daily        atorvastatin 40 MG tablet    LIPITOR    30 tablet    Take 1 tablet (40 mg) by mouth daily    Hyperlipidemia LDL goal <100       blood glucose monitoring lancets      as needed Reported on 4/7/2017        cephALEXin 500 MG capsule    KEFLEX    30 capsule    Take 1 capsule (500 mg) by mouth 3 times daily    Local infection of skin and subcutaneous tissue       fenofibrate 145 MG tablet      Take 145 mg by mouth daily        GENTLE STOOL SOFTENER PO      Take 200 mg by mouth daily        insulin aspart 100 UNIT/ML injection    NovoLOG FLEXPEN    15 mL    INJECT 10 UNITS SUBCUTANEOUSLY THREE TIMES DAILY WITH MEALS. (NEED TO BE SEEN IN CLINIC FOR FURTHER REFILLS) Refills at MD visit    Type 2 diabetes mellitus with other circulatory complication, with long-term current use of insulin (H)       insulin glargine 100 UNIT/ML injection    LANTUS SOLOSTAR    10 mL    INJECT 35 UNITS SUBCUTANEOUSLY AT BEDTIME    Type 2 diabetes mellitus with other circulatory complication, with long-term current use of insulin (H)       insulin pen needle 31G X 6 MM     100 each    Use 4 daily or as directed.    Type 2 diabetes mellitus with other circulatory complication, with long-term current use of  insulin (H)       ipratropium - albuterol 0.5 mg/2.5 mg/3 mL 0.5-2.5 (3) MG/3ML neb solution    DUONEB    360 mL    Take 1 vial (3 mLs) by nebulization every 4 hours as needed for shortness of breath / dyspnea or wheezing    Chronic obstructive pulmonary disease, unspecified COPD type (H)       ledipasvir-sofosbuvir  MG per tablet   Generic drug:  ledipasvir-sofosbuvir           lisinopril 40 MG tablet    PRINIVIL/ZESTRIL    90 tablet    Take 1 tablet (40 mg) by mouth daily    Essential hypertension with goal blood pressure less than 140/90       metFORMIN 500 MG 24 hr tablet    GLUCOPHAGE-XR    90 tablet    Take 1 tablet (500 mg) by mouth daily    Type 2 diabetes mellitus without complication, without long-term current use of insulin (H)       nitroGLYcerin 0.4 MG sublingual tablet    NITROSTAT     Place 0.4 mg under the tongue as needed        RA NICOTINE GUM MT           traZODone 100 MG tablet    DESYREL    90 tablet    Take 1 tablet (100 mg) by mouth At Bedtime At night    Insomnia, unspecified type

## 2017-09-14 NOTE — PROGRESS NOTES
SUBJECTIVE:   Hill Ardon is a 57 year old male who presents to clinic today for the following health issues:    Pt not taking any medicines-has Not been taking his Insulin o Lipitor    Diabetes Follow-up      Patient is checking blood sugars: not at all    Diabetic concerns: None     Symptoms of hypoglycemia (low blood sugar): none     Paresthesias (numbness or burning in feet) or sores: No     Date of last diabetic eye exam: 2016    Hyperlipidemia Follow-Up      Rate your low fat/cholesterol diet?: good    Taking statin?  Yes, no muscle aches from statin    Other lipid medications/supplements?:  none    Hypertension Follow-up      Outpatient blood pressures are not being checked.    Low Salt Diet: no added salt        Amount of exercise or physical activity: yes    Problems taking medications regularly: No    Medication side effects: none  Diet: diabetic    Vascular Disease Follow-up:  Coronary Artery Disease (CAD)      Chest pain or pressure, left side neck or arm pain: No    Shortness of breath/increased sweats/nausea with exertion: No    Pain in calves walking 1-2 blocks: No    Worsened or new symptoms since last visit: No    Nitroglycerin use: no    Daily aspirin use: Yes    COPD Follow-Up    Symptoms are currently: stable    Current fatigue or dyspnea with ambulation: none    Shortness of breath: stable    Increased or change in Cough/Sputum: No    Fever(s): No        Any ER/UC or hospital admissions since your last visit? No     History   Smoking Status     Former Smoker     Types: Cigarettes     Quit date: 4/10/2017   Smokeless Tobacco     Never Used     Comment: quit cold turkey-some gum-regular and nicotine     No results found for: FEV1, RIN1CSB        Amount of exercise or physical activity: None    Problems taking medications regularly: Yes,  noncompliance    Medication side effects: none  Diet: not watching        Problem list and histories reviewed & adjusted, as indicated.  Additional history:  as documented    Patient Active Problem List   Diagnosis     Adenomatous polyp of colon     Cardiomyopathy (H)     Cocaine dependence (H)     Current smoker     Presbyopia     Obstructive sleep apnea syndrome     Sensorineural hearing loss     Type 2 diabetes mellitus with other circulatory complications (H)     Hyperlipidemia LDL goal <100     Chronic hepatitis C without hepatic coma (H)     Coronary artery disease involving native heart without angina pectoris, unspecified vessel or lesion type     Insomnia, unspecified type     Chronic obstructive pulmonary disease, unspecified COPD type (H)     Diabetic polyneuropathy associated with type 2 diabetes mellitus (H)     Essential hypertension with goal blood pressure less than 140/90     Morbid obesity due to excess calories (H)     Gastroesophageal reflux disease with esophagitis     History of acute myocardial infarction     Past Surgical History:   Procedure Laterality Date     CHOLECYSTECTOMY  2009     LAPAROSCOPY, SURGICAL; REPAIR INCISIONAL OR VENTRAL HERNIA  2011     STENT, CORONARY, SARA  9-2015    stent placement       Social History   Substance Use Topics     Smoking status: Former Smoker     Types: Cigarettes     Quit date: 4/10/2017     Smokeless tobacco: Never Used      Comment: quit cold turkey-some gum-regular and nicotine     Alcohol use No     Family History   Problem Relation Age of Onset     CANCER Father      Liver ca     DIABETES No family hx of      Coronary Artery Disease No family hx of          Current Outpatient Prescriptions   Medication Sig Dispense Refill     insulin aspart (NOVOLOG FLEXPEN) 100 UNIT/ML injection INJECT 10 UNITS SUBCUTANEOUSLY THREE TIMES DAILY WITH MEALS. (NEED TO BE SEEN IN CLINIC FOR FURTHER REFILLS)  Refills at MD visit 15 mL 0     insulin glargine (LANTUS SOLOSTAR) 100 UNIT/ML injection INJECT 35 UNITS SUBCUTANEOUSLY AT BEDTIME 10 mL 0     cephALEXin (KEFLEX) 500 MG capsule Take 1 capsule (500 mg) by mouth 3 times  daily 30 capsule 0     lisinopril (PRINIVIL/ZESTRIL) 40 MG tablet Take 1 tablet (40 mg) by mouth daily 90 tablet 3     insulin pen needle 31G X 6 MM Use 4 daily or as directed. 100 each prn     aspirin 81 MG tablet Take 81 mg by mouth daily        Docusate Sodium (GENTLE STOOL SOFTENER PO) Take 200 mg by mouth daily        blood glucose monitoring (ROBYN MICROLET) lancets as needed Reported on 4/7/2017       nitroglycerin (NITROSTAT) 0.4 MG SL tablet Place 0.4 mg under the tongue as needed       albuterol (PROAIR HFA, PROVENTIL HFA, VENTOLIN HFA) 108 (90 BASE) MCG/ACT inhaler Inhale 2 puffs into the lungs every 4 hours as needed for shortness of breath / dyspnea or wheezing q 4 hours as needed 3 Inhaler 3     traZODone (DESYREL) 100 MG tablet Take 1 tablet (100 mg) by mouth At Bedtime At night (Patient taking differently: Take 100 mg by mouth nightly as needed for sleep At night) 90 tablet 3     atorvastatin (LIPITOR) 40 MG tablet Take 1 tablet (40 mg) by mouth daily 30 tablet 1     ipratropium - albuterol 0.5 mg/2.5 mg/3 mL (DUONEB) 0.5-2.5 (3) MG/3ML nebulization Take 1 vial (3 mLs) by nebulization every 4 hours as needed for shortness of breath / dyspnea or wheezing 360 mL 3     metFORMIN (GLUCOPHAGE-XR) 500 MG 24 hr tablet Take 1 tablet (500 mg) by mouth daily (Patient not taking: Reported on 9/14/2017) 90 tablet 3     Nicotine Polacrilex (RA NICOTINE GUM MT)        LEDIPASVIR-SOFOSBUVIR  MG per tablet        [DISCONTINUED] insulin aspart (NOVOLOG FLEXPEN) 100 UNIT/ML injection INJECT 10 UNITS SUBCUTANEOUSLY THREE TIMES DAILY WITH MEALS. (NEED TO BE SEEN IN CLINIC FOR FURTHER REFILLS)  Refills at MD visit (Patient taking differently: 8 Units TID with meals if over 150.) 15 mL 0     [DISCONTINUED] insulin glargine (LANTUS SOLOSTAR) 100 UNIT/ML injection INJECT 35 UNITS SUBCUTANEOUSLY AT BEDTIME 10 mL 0     fenofibrate 145 MG tablet Take 145 mg by mouth daily       Allergies   Allergen Reactions     Food  "Anaphylaxis     Cherries     Recent Labs   Lab Test  09/14/17   0745  07/19/17   1003  02/23/17   1033  01/31/17   1438  08/24/16   0923  07/15/16   0758   A1C  10.7*  9.4*  10.2*  11.8*  9.9*  9.9*   LDL   --    --    --   129*   --   129*   HDL   --    --    --   37*   --   42   TRIG   --    --    --   343*   --   341*   ALT   --    --   25  48  93*  85*   CR   --    --   0.91  0.80   --   0.98   GFRESTIMATED   --    --   85  >90  Non  GFR Calc     --   79   GFRESTBLACK   --    --   >90   GFR Calc    >90   GFR Calc     --   >90   GFR Calc     POTASSIUM   --    --   4.3  3.9   --   3.9   TSH   --    --    --    --    --   3.22      BP Readings from Last 3 Encounters:   09/14/17 124/72   02/23/17 133/84   01/31/17 104/76    Wt Readings from Last 3 Encounters:   09/14/17 220 lb (99.8 kg)   02/23/17 235 lb 9.6 oz (106.9 kg)   01/31/17 240 lb (108.9 kg)                  Labs reviewed in EPIC          Reviewed and updated as needed this visit by clinical staff     Reviewed and updated as needed this visit by Provider         ROS:  C: NEGATIVE for fever, chills, change in weight  INTEGUMENTARY/SKIN: NEGATIVE for worrisome rashes, moles or lesions  E/M: NEGATIVE for ear, mouth and throat problems  R: NEGATIVE for significant cough or SOB  CV: NEGATIVE for chest pain, palpitations or peripheral edema  GI: NEGATIVE for nausea, abdominal pain, heartburn, or change in bowel habits  NEURO: NEGATIVE for weakness, dizziness or paresthesias    OBJECTIVE:     /72  Pulse 89  Temp 98.2  F (36.8  C)  Resp 14  Ht 5' 9\" (1.753 m)  Wt 220 lb (99.8 kg)  SpO2 96%  BMI 32.49 kg/m2  Body mass index is 32.49 kg/(m^2).  GENERAL: alert, no distress and obese  NECK: no adenopathy, no asymmetry, masses, or scars and thyroid normal to palpation  RESP: lungs clear to auscultation - no rales, rhonchi or wheezes  CV: regular rate and rhythm, normal S1 S2, no S3 or S4, no " "murmur, click or rub, no peripheral edema and peripheral pulses strong  ABDOMEN: soft, nontender, no hepatosplenomegaly, no masses and bowel sounds normal  MS: no gross musculoskeletal defects noted, no edema  Redness umlical area  With mild tenderness   Diagnostic Test Results:  Results for orders placed or performed in visit on 09/14/17 (from the past 24 hour(s))   Hemoglobin A1c   Result Value Ref Range    Hemoglobin A1C 10.7 (H) 4.3 - 6.0 %       ASSESSMENT/PLAN:         BMI:   Estimated body mass index is 32.49 kg/(m^2) as calculated from the following:    Height as of this encounter: 5' 9\" (1.753 m).    Weight as of this encounter: 220 lb (99.8 kg).   Weight management plan: low bakari diet/Exercise        1. Coronary artery disease involving native heart without angina pectoris, unspecified vessel or lesion type  Stable     2. Uncontrolled type 2 diabetes mellitus with other circulatory complication, with long-term current use of insulin (H)  Discussed Importance of taking his medicines as recommended   Discussed Risks of strokes,Heart disease Renal failure  Etc with Uncontrolled Disease  - BASIC METABOLIC PANEL  - HEPATIC PANEL  - Albumin Random Urine Quantitative with Creat Ratio  - Hemoglobin A1c  - OPHTHALMOLOGY ADULT REFERRAL  - insulin aspart (NOVOLOG FLEXPEN) 100 UNIT/ML injection; INJECT 10 UNITS SUBCUTANEOUSLY THREE TIMES DAILY WITH MEALS. (NEED TO BE SEEN IN CLINIC FOR FURTHER REFILLS)  Refills at MD visit  Dispense: 15 mL; Refill: 0  - insulin glargine (LANTUS SOLOSTAR) 100 UNIT/ML injection; INJECT 35 UNITS SUBCUTANEOUSLY AT BEDTIME  Dispense: 10 mL; Refill: 0  He will start Insulin and check accuchecks  Advised see MTM 10 days   See me 6 weeks  3. Current smoker  Discussed quit-Pt declines    4. Diabetic polyneuropathy associated with type 2 diabetes mellitus (H)      5. Morbid obesity due to excess calories (H)  Low bakari/Diabetic diet/Exercise    6. Essential hypertension with goal blood pressure " less than 140/90  controlled    7. Adenomatous polyp of colon, unspecified part of colon  Advised colonoscopy    8. Local infection of skin and subcutaneous tissue  SEE [facility] care orders  The potential side effects of this medication have been discussed with the patient.  Call if any significant problems with these are experienced.    - cephALEXin (KEFLEX) 500 MG capsule; Take 1 capsule (500 mg) by mouth 3 times daily  Dispense: 30 capsule; Refill: 0  Follow up if not better 1 week  9. Need for prophylactic vaccination and inoculation against influenza  done  - FLU VAC, SPLIT VIRUS IM > 3 YO (QUADRIVALENT) [36913]  - Vaccine Administration, Initial [44472]    10. Screen for colon cancer  Advised   - GASTROENTEROLOGY ADULT REF PROCEDURE ONLY    COPD is stable   Advised needs to quit smoking    Fariba Ding MD  [facility]

## 2017-09-14 NOTE — NURSING NOTE
"Chief Complaint   Patient presents with     Diabetes     Lipids     Hypertension       Initial /72  Pulse 89  Temp 98.2  F (36.8  C)  Resp 14  Ht 5' 9\" (1.753 m)  Wt 220 lb (99.8 kg)  SpO2 96%  BMI 32.49 kg/m2 Estimated body mass index is 32.49 kg/(m^2) as calculated from the following:    Height as of this encounter: 5' 9\" (1.753 m).    Weight as of this encounter: 220 lb (99.8 kg).  Medication Reconciliation: complete     Marina Elliott. MA      "

## 2017-09-18 ENCOUNTER — TELEPHONE (OUTPATIENT)
Dept: INTERNAL MEDICINE | Facility: CLINIC | Age: 57
End: 2017-09-18

## 2017-09-18 ENCOUNTER — TELEPHONE (OUTPATIENT)
Dept: PHARMACY | Facility: OTHER | Age: 57
End: 2017-09-18

## 2017-09-18 DIAGNOSIS — E11.59 TYPE 2 DIABETES MELLITUS WITH OTHER CIRCULATORY COMPLICATIONS (H): Primary | ICD-10-CM

## 2017-09-18 NOTE — TELEPHONE ENCOUNTER
Capri contour next test strips      Last Written Prescription Date:  05-10-16  Last Fill Quantity: 100,   # refills: 0  Last Office Visit with G, UMP or  Health prescribing provider: 09-14-17  Future Office visit:    Next 5 appointments (look out 90 days)     Nov 20, 2017 11:00 AM CST   Telephone Visit with HEALTH  - [address] ([facility]    [address] [facility] [address]   [phone]                   Routing refill request to provider for review/approval because:  Drug not active on patient's medication list    Thanks!  Eusebio Estrella [facility]- Float Technician  For [facility]

## 2017-09-27 ENCOUNTER — TELEPHONE (OUTPATIENT)
Dept: FAMILY MEDICINE | Facility: CLINIC | Age: 57
End: 2017-09-27

## 2017-09-27 DIAGNOSIS — E78.5 HYPERLIPIDEMIA LDL GOAL <100: ICD-10-CM

## 2017-09-27 DIAGNOSIS — Z79.4 TYPE 2 DIABETES MELLITUS WITH OTHER CIRCULATORY COMPLICATION, WITH LONG-TERM CURRENT USE OF INSULIN (H): ICD-10-CM

## 2017-09-27 DIAGNOSIS — E11.59 TYPE 2 DIABETES MELLITUS WITH OTHER CIRCULATORY COMPLICATION, WITH LONG-TERM CURRENT USE OF INSULIN (H): ICD-10-CM

## 2017-09-27 NOTE — TELEPHONE ENCOUNTER
Pt arrived at pharmacy drive thru today. After a conversation with patient, we have some active meds on our records that patient states he is no longer taking. We would like to confirm his current medications. His chart indicated he

## 2017-09-27 NOTE — TELEPHONE ENCOUNTER
Continuation of previous note: Can we confirm that carvedilol has been discontinued? Chart indicated he stopped this on his own. He states he has not taken his metformin, but plans to start. He reports he has an appt with the diabetic educator. Hopefully, we can an update on his insulin doses. Pt reports he has been trying to manage his diabetes on his own. He has lost approx. 60 pounds  Thank you  Tiffanie De Leon [facility]  Phone [phone]  Fax      [phone]

## 2017-09-27 NOTE — TELEPHONE ENCOUNTER
atorvastatin (LIPITOR) 40 MG tablet    Last Written Prescription Date: 07/14/16  Last Fill Quantity: 30, # refills: 1  Last Office Visit with FMG, UMP or  Health prescribing provider: 09/14/17  Next 5 appointments (look out 90 days)     Sep 28, 2017  9:00 AM CDT   Office Visit with Lashonda Fuchs RPH   [facility] ([facility])    [address] [place] [id number] [phone]            Nov 20, 2017 11:00 AM CST   Telephone Visit with HEALTH  - [facility]   [facility]    [address] [place] [id number] [phone]                   Lab Results   Component Value Date    CHOL 235 01/31/2017     Lab Results   Component Value Date    HDL 37 01/31/2017     Lab Results   Component Value Date     01/31/2017     Lab Results   Component Value Date    TRIG 343 01/31/2017     No results found for: MARK [facility]

## 2017-09-27 NOTE — TELEPHONE ENCOUNTER
Pt has not been compliant in taking his medicines  I will not give coreg as his Blood Pressure is okay for now

## 2017-09-28 ENCOUNTER — TELEPHONE (OUTPATIENT)
Dept: NURSING | Facility: CLINIC | Age: 57
End: 2017-09-28

## 2017-09-29 RX ORDER — ATORVASTATIN CALCIUM 40 MG/1
TABLET, FILM COATED ORAL
Qty: 90 TABLET | Refills: 0 | Status: SHIPPED | OUTPATIENT
Start: 2017-09-29

## 2017-09-29 NOTE — TELEPHONE ENCOUNTER
Routing refill request to provider for review/approval because:  A break in medication  Pt was to recheck in 6 weeks-no F/U noted.  Lab Results   Component Value Date    CHOL 235 01/31/2017     Lab Results   Component Value Date    HDL 37 01/31/2017     Lab Results   Component Value Date     01/31/2017     Lab Results   Component Value Date    TRIG 343 01/31/2017     No results found for: CHOLMADHURIO

## 2017-10-02 ENCOUNTER — VIRTUAL VISIT (OUTPATIENT)
Dept: NURSING | Facility: CLINIC | Age: 57
End: 2017-10-02

## 2017-10-02 DIAGNOSIS — E66.01 MORBID OBESITY DUE TO EXCESS CALORIES (H): Primary | ICD-10-CM

## 2017-10-02 PROCEDURE — 99207 ZZC HEALTH COACHING, NO CHARGE: CPT

## 2017-10-02 NOTE — PROGRESS NOTES
October 2, 2017    [facility]  [address]  [place] [address]  [phone]  Health Coaching Progress Note    Patient Name: Hill Ardon Date: October 2, 2017      Session Length: 30      DATA    PRM Master Survey Scores Reviewed: Yes    Core Healthy Days Survey:    Would you say that in general your health is: : Good    JEFE Score (Last Two) 5/24/2017 10/2/2017   JEFE Raw Score 40 39   Activation Score 100 90.2   JEFE Level 4 4       PHQ-2 Score 10/2/2017 5/24/2017   PHQ-2 Total Score Interpretation - Positive if 3 or more points; Administer PHQ-9 if positive 0 0       No flowsheet data found.    Treatment Objective(s) Addressed in This Session:  Target Behavior(s): diet/weight loss    Current Stressors / Issues:  Geoffrey has stress with family life, 1 yo grandson who he now has custody of, stress with work.  Geoffrey mentions that overall he has a very busy/stressful life    What Patient Does Well:   Geoffrey has made a lot of great changes for his health in the last 3 weeks.  He mentions that he has quit eating donuts and drinking pop, he has started checking his blood sugars 3 times/day and is taking his medications as directed. Geoffrey mentions that he is very active every day with his 2 year old grandson and has an active job.  Previous Successes:   Geoffrey has lost 30 pounds in 1 year  Areas in Need of Improvement:   Patient mentions that right now he is working on making the changes that he has implemented in the last 3 weeks into a habit.  He feels he is on the right track to better diabetes control. Patient mentions that in the months of June and July he didn't care about his diabetes and was eating whatever he wanted.  He then experience some neuropathy in his feet and that was a wake-up call to get better control of his diabetes.    Barriers to Change:   Stress with family and work  Reasons for Change:   Geoffrey would like to change to prevent any    Plan/Goal for the Next 4 Weeks:   GOAL #1: Avoid pop and donuts  GOAL #1 Progress Toward Goal: 25%  GOAL #2: Check blood sugar 3x/day  GOAL #2 Progress Toward Goal: 25%  GOAL #3: Take medications as directed  GOAL #3 Progress Toward Goal: 25%    Intervention:  Motivational Interviewing    MI Intervention: Expressed Empathy/Understanding, Supported Autonomy, Collaboration, Evocation, Permission to raise concern or advise, Open-ended questions, Reflections: simple and complex, Change talk (evoked) and Reframe     Change Talk Expressed by the Patient: Desire to change Ability to change Reasons to change Need to change Committment to change Activation Taking steps    Provider Response to Change Talk: E - Evoked more info from patient about behavior change, A - Affirmed patient's thoughts, decisions, or attempts at behavior change, R - Reflected patient's change talk and S - Summarized patient's change talk statements    Assessment / Progress on Treatment Objective(s) / Homework:  New Objective established this session - ACTION (Actively working towards change); Intervened by reinforcing change plan / affirming steps taken         Plan: (Homework, other):  Patient was encouraged to continue to seek condition-related information and education, as well as schedule a follow up appointment with the Health  in 4 weeks. Patient has set self-identified goals and will monitor progress until the next appointment.  Follow-up scheduled for October 30th at 10:00 am via telephone.      Shira Diaz, RD, LD

## 2017-10-02 NOTE — MR AVS SNAPSHOT
After Visit Summary   10/2/2017    Hill Ardon    MRN: [id number]           Patient Information     Date Of Birth          1960        Visit Information        Provider Department      10/2/2017 2:30 PM HEALTH  - REG[facility] [place]        Today's Diagnoses     Morbid obesity due to excess calories (H)    -  1       Follow-ups after your visit        Your next 10 appointments already scheduled     Nov 20, 2017 11:00 AM CST   Telephone Visit with HEALTH  - [address] [place] ([facility])    [address] [facility]  [place] [address] [phone]           Note: this is not an onsite visit; there is no need to come to the facility.              Who to contact     If you have questions or need follow up information about today's clinic visit or your schedule please contact [facility] directly at [phone].  Normal or non-critical lab and imaging results will be communicated to you by MyChart, letter or phone within 4 business days after the clinic has received the results. If you do not hear from us within 7 days, please contact the clinic through [employer] or phone. If you have a critical or abnormal lab result, we will notify you by phone as soon as possible.  Submit refill requests through [employer] or call your pharmacy and they will forward the refill request to us. Please allow 3 business days for your refill to be completed.          Additional Information About Your Visit        MyChart Information     [employer] gives you secure access to your electronic health record. If you see a primary care provider, you can also send messages to your care team and make appointments. If you have questions, please call your primary care clinic.  If you do not have a primary care provider, please call [phone] and they will assist you.        Care EveryWhere ID     This is your Care EveryWhere ID. This could be used by other  organizations to access your [place] medical records  [id number]         Blood Pressure from Last 3 Encounters:   09/14/17 124/72   02/23/17 133/84   01/31/17 104/76    Weight from Last 3 Encounters:   09/14/17 220 lb (99.8 kg)   02/23/17 235 lb 9.6 oz (106.9 kg)   01/31/17 240 lb (108.9 kg)              Today, you had the following     No orders found for display         Today's Medication Changes          These changes are accurate as of: 10/2/17  4:27 PM.  If you have any questions, ask your nurse or doctor.               These medicines have changed or have updated prescriptions.        Dose/Directions    traZODone 100 MG tablet   Commonly known as:  DESYREL   This may have changed:    - when to take this  - reasons to take this  - additional instructions   Used for:  Insomnia, unspecified type        Dose:  100 mg   Take 1 tablet (100 mg) by mouth At Bedtime At night   Quantity:  90 tablet   Refills:  3                Primary Care Provider Office Phone # Fax #    Fariba Ding [phone] [address] [facility] [address]        Equal Access to Services     [facility]: Hadii judi Negro, wabeckieda talib, qachristin mckenna, george rojas . So [facility] [phone].    ATENCIÓN: Si habla español, tiene a noble disposición servicios gratuitos de asistencia lingüística. [facility] [phone].    We comply with applicable federal civil rights laws and [place] laws. We do not discriminate on the basis of race, color, national origin, age, disability, sex, sexual orientation, or gender identity.            Thank you!     Thank you for choosing [facility]  for your care. Our goal is always to provide you with excellent care. Hearing back from our patients is one way we can continue to improve our services. Please take a few minutes to complete the written survey that you may receive in the mail after your visit with us. Thank  you!             Your Updated Medication List - Protect others around you: Learn how to safely use, store and throw away your medicines at www.disposemymeds.org.          This list is accurate as of: 10/2/17  4:27 PM.  Always use your most recent med list.                   Brand Name Dispense Instructions for use Diagnosis    albuterol 108 (90 BASE) MCG/ACT Inhaler    PROAIR HFA/PROVENTIL HFA/VENTOLIN HFA    3 Inhaler    Inhale 2 puffs into the lungs every 4 hours as needed for shortness of breath / dyspnea or wheezing q 4 hours as needed    Chronic obstructive pulmonary disease, unspecified COPD type (H)       aspirin 81 MG tablet      Take 81 mg by mouth daily        * atorvastatin 40 MG tablet    LIPITOR    30 tablet    Take 1 tablet (40 mg) by mouth daily    Hyperlipidemia LDL goal <100       * atorvastatin 40 MG tablet    LIPITOR    90 tablet    TAKE ONE TABLET BY MOUTH EVERY DAY    Hyperlipidemia LDL goal <100, Type 2 diabetes mellitus with other circulatory complication, with long-term current use of insulin (H)       blood glucose monitoring lancets      as needed Reported on 4/7/2017        blood glucose monitoring test strip    no brand specified    400 strip    Use to test blood sugars 4 times daily or as directed    Type 2 diabetes mellitus with other circulatory complications       cephALEXin 500 MG capsule    KEFLEX    30 capsule    Take 1 capsule (500 mg) by mouth 3 times daily    Local infection of skin and subcutaneous tissue       fenofibrate 145 MG tablet      Take 145 mg by mouth daily        GENTLE STOOL SOFTENER PO      Take 200 mg by mouth daily        HARVONI  MG per tablet   Generic drug:  ledipasvir-sofosbuvir           insulin aspart 100 UNIT/ML injection    NovoLOG FLEXPEN    15 mL    INJECT 10 UNITS SUBCUTANEOUSLY THREE TIMES DAILY WITH MEALS. (NEED TO BE SEEN IN CLINIC FOR FURTHER REFILLS) Refills at MD visit    Uncontrolled type 2 diabetes mellitus with other circulatory  complication, with long-term current use of insulin (H)       insulin glargine 100 UNIT/ML injection    LANTUS SOLOSTAR    10 mL    INJECT 35 UNITS SUBCUTANEOUSLY AT BEDTIME    Uncontrolled type 2 diabetes mellitus with other circulatory complication, with long-term current use of insulin (H)       insulin pen needle 31G X 6 MM     100 each    Use 4 daily or as directed.    Type 2 diabetes mellitus with other circulatory complication, with long-term current use of insulin (H)       ipratropium - albuterol 0.5 mg/2.5 mg/3 mL 0.5-2.5 (3) MG/3ML neb solution    DUONEB    360 mL    Take 1 vial (3 mLs) by nebulization every 4 hours as needed for shortness of breath / dyspnea or wheezing    Chronic obstructive pulmonary disease, unspecified COPD type (H)       lisinopril 40 MG tablet    PRINIVIL/ZESTRIL    90 tablet    Take 1 tablet (40 mg) by mouth daily    Essential hypertension with goal blood pressure less than 140/90       metFORMIN 500 MG 24 hr tablet    GLUCOPHAGE-XR    90 tablet    Take 1 tablet (500 mg) by mouth daily    Type 2 diabetes mellitus without complication, without long-term current use of insulin (H)       nitroGLYcerin 0.4 MG sublingual tablet    NITROSTAT     Place 0.4 mg under the tongue as needed        RA NICOTINE GUM MT           traZODone 100 MG tablet    DESYREL    90 tablet    Take 1 tablet (100 mg) by mouth At Bedtime At night    Insomnia, unspecified type       * Notice:  This list has 2 medication(s) that are the same as other medications prescribed for you. Read the directions carefully, and ask your doctor or other care provider to review them with you.

## 2017-10-27 RX ORDER — INSULIN GLARGINE 100 [IU]/ML
INJECTION, SOLUTION SUBCUTANEOUS
Qty: 15 ML | Refills: 4 | Status: SHIPPED | OUTPATIENT
Start: 2017-10-27

## 2017-10-27 NOTE — TELEPHONE ENCOUNTER
Signed Prescriptions:                        Disp   Refills    LANTUS SOLOSTAR 100 UNIT/ML soln           15 mL  4        Sig: INJECT 35 UNITS UNDER THE SKIN AT BEDTIME  Authorizing Provider: LUCILA HOLDER  Ordering User: SHIRA TRAN RN refilled medication per [facility] Refill Protocol.     Shira Tran RN

## 2017-10-30 ENCOUNTER — VIRTUAL VISIT (OUTPATIENT)
Dept: NURSING | Facility: CLINIC | Age: 57
End: 2017-10-30

## 2017-10-30 DIAGNOSIS — E66.01 MORBID OBESITY DUE TO EXCESS CALORIES (H): Primary | ICD-10-CM

## 2017-10-30 PROCEDURE — 99207 ZZC HEALTH COACHING, NO CHARGE: CPT

## 2017-10-30 NOTE — MR AVS SNAPSHOT
After Visit Summary   10/30/2017    Hill Ardon    MRN: [id number]           Patient Information     Date Of Birth          1960        Visit Information        Provider Department      10/30/2017 10:00 AM [facility]        Today's Diagnoses     Morbid obesity due to excess calories (H)    -  1       Follow-ups after your visit        Your next 10 appointments already scheduled     Nov 20, 2017 11:00 AM CST   Telephone Visit with [facility]   [facility]    [address] [place] [address] [phone]           Note: this is not an onsite visit; there is no need to come to the facility.            Nov 27, 2017 10:00 AM CST   Telephone Visit with [address] ([facility])    [address] [facility] [address] [phone]           Note: this is not an onsite visit; there is no need to come to the facility.              Who to contact     If you have questions or need follow up information about today's clinic visit or your schedule please contact [facility] directly at [phone].  Normal or non-critical lab and imaging results will be communicated to you by "[employer]hart, letter or phone within 4 business days after the clinic has received the results. If you do not hear from us within 7 days, please contact the clinic through "[employer]hart or phone. If you have a critical or abnormal lab result, we will notify you by phone as soon as possible.  Submit refill requests through [employer] or call your pharmacy and they will forward the refill request to us. Please allow 3 business days for your refill to be completed.          Additional Information About Your Visit        "[employer]hart Information     [employer] gives you secure access to your electronic health record. If you see a primary care provider, you can also send messages to  your care team and make appointments. If you have questions, please call your primary care clinic.  If you do not have a primary care provider, please call [phone] and they will assist you.        Care EveryWhere ID     This is your Care EveryWhere ID. This could be used by other organizations to access your [place] medical records  [id number]         Blood Pressure from Last 3 Encounters:   09/14/17 124/72   02/23/17 133/84   01/31/17 104/76    Weight from Last 3 Encounters:   09/14/17 220 lb (99.8 kg)   02/23/17 235 lb 9.6 oz (106.9 kg)   01/31/17 240 lb (108.9 kg)              Today, you had the following     No orders found for display         Today's Medication Changes          These changes are accurate as of: 10/30/17 11:06 AM.  If you have any questions, ask your nurse or doctor.               These medicines have changed or have updated prescriptions.        Dose/Directions    traZODone 100 MG tablet   Commonly known as:  DESYREL   This may have changed:    - when to take this  - reasons to take this  - additional instructions   Used for:  Insomnia, unspecified type        Dose:  100 mg   Take 1 tablet (100 mg) by mouth At Bedtime At night   Quantity:  90 tablet   Refills:  3                Primary Care Provider Office Phone # Fax #    Fariba Ding [phone] [address] [facility] [address]        Equal Access to Services     [facility]: Hadii judi graves [place], waaxda luqadaha, qaybta kaalmadelfina mckenna, george rojas . So [facility] [phone].    ATENCIÓN: Si habla español, tiene a noble disposición servicios gratuitos de asistencia lingüística. Llame al [phone].    We comply with applicable federal civil rights laws and [place] laws. We do not discriminate on the basis of race, color, national origin, age, disability, sex, sexual orientation, or gender identity.            Thank you!     Thank you for choosing [facility]  [facility]  for your care. Our goal is always to provide you with excellent care. Hearing back from our patients is one way we can continue to improve our services. Please take a few minutes to complete the written survey that you may receive in the mail after your visit with us. Thank you!             Your Updated Medication List - Protect others around you: Learn how to safely use, store and throw away your medicines at www.disposemymeds.org.          This list is accurate as of: 10/30/17 11:06 AM.  Always use your most recent med list.                   Brand Name Dispense Instructions for use Diagnosis    albuterol 108 (90 BASE) MCG/ACT Inhaler    PROAIR HFA/PROVENTIL HFA/VENTOLIN HFA    3 Inhaler    Inhale 2 puffs into the lungs every 4 hours as needed for shortness of breath / dyspnea or wheezing q 4 hours as needed    Chronic obstructive pulmonary disease, unspecified COPD type (H)       aspirin 81 MG tablet      Take 81 mg by mouth daily        * atorvastatin 40 MG tablet    LIPITOR    30 tablet    Take 1 tablet (40 mg) by mouth daily    Hyperlipidemia LDL goal <100       * atorvastatin 40 MG tablet    LIPITOR    90 tablet    TAKE ONE TABLET BY MOUTH EVERY DAY    Hyperlipidemia LDL goal <100, Type 2 diabetes mellitus with other circulatory complication, with long-term current use of insulin (H)       blood glucose monitoring lancets      as needed Reported on 4/7/2017        blood glucose monitoring test strip    no brand specified    400 strip    Use to test blood sugars 4 times daily or as directed    Type 2 diabetes mellitus with other circulatory complications       cephALEXin 500 MG capsule    KEFLEX    30 capsule    Take 1 capsule (500 mg) by mouth 3 times daily    Local infection of skin and subcutaneous tissue       fenofibrate 145 MG tablet      Take 145 mg by mouth daily        GENTLE STOOL SOFTENER PO      Take 200 mg by mouth daily        HARVONI  MG per tablet   Generic drug:   ledipasvir-sofosbuvir           insulin aspart 100 UNIT/ML injection    NovoLOG FLEXPEN    15 mL    INJECT 10 UNITS SUBCUTANEOUSLY THREE TIMES DAILY WITH MEALS. (NEED TO BE SEEN IN CLINIC FOR FURTHER REFILLS) Refills at MD visit    Uncontrolled type 2 diabetes mellitus with other circulatory complication, with long-term current use of insulin (H)       insulin pen needle 31G X 6 MM     100 each    Use 4 daily or as directed.    Type 2 diabetes mellitus with other circulatory complication, with long-term current use of insulin (H)       ipratropium - albuterol 0.5 mg/2.5 mg/3 mL 0.5-2.5 (3) MG/3ML neb solution    DUONEB    360 mL    Take 1 vial (3 mLs) by nebulization every 4 hours as needed for shortness of breath / dyspnea or wheezing    Chronic obstructive pulmonary disease, unspecified COPD type (H)       LANTUS SOLOSTAR 100 UNIT/ML injection   Generic drug:  insulin glargine     15 mL    INJECT 35 UNITS UNDER THE SKIN AT BEDTIME    Uncontrolled type 2 diabetes mellitus with other circulatory complication, with long-term current use of insulin (H)       lisinopril 40 MG tablet    PRINIVIL/ZESTRIL    90 tablet    Take 1 tablet (40 mg) by mouth daily    Essential hypertension with goal blood pressure less than 140/90       metFORMIN 500 MG 24 hr tablet    GLUCOPHAGE-XR    90 tablet    Take 1 tablet (500 mg) by mouth daily    Type 2 diabetes mellitus without complication, without long-term current use of insulin (H)       nitroGLYcerin 0.4 MG sublingual tablet    NITROSTAT     Place 0.4 mg under the tongue as needed        RA NICOTINE GUM MT           traZODone 100 MG tablet    DESYREL    90 tablet    Take 1 tablet (100 mg) by mouth At Bedtime At night    Insomnia, unspecified type       * Notice:  This list has 2 medication(s) that are the same as other medications prescribed for you. Read the directions carefully, and ask your doctor or other care provider to review them with you.

## 2017-10-30 NOTE — PROGRESS NOTES
October 30, 2017    [facility]  [address] [facility]  [place] [address]  [phone]  Health Coaching Progress Note    Patient Name: Hill Ardon Date: October 30, 2017      Session Length: 10 minutes      DATA    PRM Master Survey Scores Reviewed: Yes    Core Healthy Days Survey:         JEFE Score (Last Two) 5/24/2017 10/2/2017   JEFE Raw Score 40 39   Activation Score 100 90.2   JEFE Level 4 4       PHQ-2 Score 10/2/2017 5/24/2017   PHQ-2 Total Score Interpretation - Positive if 3 or more points; Administer PHQ-9 if positive 0 0       No flowsheet data found.    Treatment Objective(s) Addressed in This Session:  Target Behavior(s): diet/weight loss    Current Stressors / Issues:  Geoffrey mentions the same stressors as usual, he also mentioned that he is officially adopting his grandson on November 18th.    What Patient Does Well:   Geoffrey notes that he has been taking his medications as instructed, he has also been checking his blood sugars and mentions that they have been below 200, which he notes is a big improvement from what they have been.  Geoffrey has also been doing a good job staying away from [employer] and [employer].  He remains active taking care of his grandson and with his job  Previous Successes:   Improvements in blood sugars, per patient  Areas in Need of Improvement:   Geoffrey is working on making these changes into a habit.  He also mentions that he is thinking about quitting smoking, but he is not committed yet  Barriers to Change:   Stress with caring for grandson and job  Reasons for Change:   Geoffrey would like to change to prevent complications with his diabetes and also to be around for a long time to care for his grandson who he is adopting.  Plan/Goal for the Next 4 Weeks:   GOAL #1: Avoid pop and donuts  GOAL #1 Progress Toward Goal: 50%  GOAL #2: Check blood sugar 3x/day  GOAL #2 Progress Toward Goal: 50%  GOAL #3: Take medications as  directed  GOAL #3 Progress Toward Goal: 50%    Intervention:  Motivational Interviewing    MI Intervention: Expressed Empathy/Understanding, Supported Autonomy, Collaboration, Evocation, Permission to raise concern or advise, Open-ended questions, Reflections: simple and complex, Change talk (evoked) and Reframe     Change Talk Expressed by the Patient: Desire to change Ability to change Reasons to change Need to change Committment to change Activation Taking steps    Provider Response to Change Talk: E - Evoked more info from patient about behavior change, A - Affirmed patient's thoughts, decisions, or attempts at behavior change, R - Reflected patient's change talk and S - Summarized patient's change talk statements    Assessment / Progress on Treatment Objective(s) / Homework:  Satisfactory progress - ACTION (Actively working towards change); Intervened by reinforcing change plan / affirming steps taken         Plan: (Homework, other):  Patient was encouraged to continue to seek condition-related information and education, as well as schedule a follow up appointment with the Health  in 4 weeks. Patient has set self-identified goals and will monitor progress until the next appointment.  Follow-up scheduled for November 27th at 10:00 am.      Shira Diaz, JI, LD

## 2017-10-31 ENCOUNTER — TELEPHONE (OUTPATIENT)
Dept: PHARMACY | Facility: CLINIC | Age: 57
End: 2017-10-31

## 2017-10-31 NOTE — TELEPHONE ENCOUNTER
Pt no-showed scheduled MTM appt on 9/28 and has not responded to contact attempts to re-schedule.  We will stop reaching out to him at this time, but please let us know if we can assist in his care in the future.  Routing to PCP as FYOSCAR - thanks for the referral!    Lashonda Fuchs, PharmD, BCACP  Medication Therapy Management Provider  Pager: [phone]

## 2017-11-12 ENCOUNTER — HEALTH MAINTENANCE LETTER (OUTPATIENT)
Age: 57
End: 2017-11-12

## 2017-11-21 ENCOUNTER — VIRTUAL VISIT (OUTPATIENT)
Dept: NURSING | Facility: CLINIC | Age: 57
End: 2017-11-21

## 2017-11-21 DIAGNOSIS — F17.200 CURRENT SMOKER: Primary | ICD-10-CM

## 2017-11-21 PROCEDURE — 99207 ZZC HEALTH COACHING, NO CHARGE: CPT

## 2017-11-21 NOTE — PROGRESS NOTES
"November 21, 2017    [facility]  [address] [facility]  [place] [address] [phone]  Health Coaching Progress Note    Patient Name: Hill Ardon Date: November 21, 2017      Session Length: 10      DATA    PRM Master Survey Scores Reviewed: Yes    Core Healthy Days Survey:         JEFE Score (Last Two) 5/24/2017 10/2/2017   JEFE Raw Score 40 39   Activation Score 100 90.2   JEFE Level 4 4       PHQ-2 Score 10/2/2017 5/24/2017   PHQ-2 Total Score Interpretation - Positive if 3 or more points; Administer PHQ-9 if positive 0 0       No flowsheet data found.    Treatment Objective(s) Addressed in This Session:  Target Behavior(s): smoking    Current Stressors / Issues:  Geoffrey shares today that his stress is really high currently with work and the adoption of his grandson being finalized recently and that he still wants to quit smoking someday again, but states that now is not the right time for him.    What Patient Does Well:   Geoffrey is honest with writer as always. Geoffrey states that it is okay for writer to \"pester\" him again about quitting again in the future and that perhaps then will be a better time.    Previous Successes:   Geoffrey quit smoking for a short time back in April of 2017.    Areas in Need of Improvement:   Geoffrey states that the area in need of improvement at this time is his diabetes and that he is working on this but that he also knows he wants to quit smoking again some day as he now has adopted his grandson, Anival, and wants to be around at least until he turns 16 or 19. Writer verbalizes understanding and agreement with Geoffrey and encourages him to contact writer if ready before our next scheduled follow-up in April 2018 (1 year post quit date).    Barriers to Change:   Geoffrey shares that his stress is too high to consider quitting smoking at this time.    Reasons for Change:   Geoffrey eventually wishes to quit smoking for his health and " self-esteem.    Plan/Goal for the Next 4 Weeks:     GOAL #1: Contact tobacco cessation  Sam if ready to quit before April 2018  GOAL #1 Progress Toward Goal: 0%    Intervention:  Motivational Interviewing    MI Intervention: Expressed Empathy/Understanding, Supported Autonomy, Collaboration, Evocation, Permission to raise concern or advise, Open-ended questions, Reflections: simple and complex, Change talk (evoked) and Reframe     Change Talk Expressed by the Patient: Desire to change Reasons to change    Provider Response to Change Talk: E - Evoked more info from patient about behavior change, A - Affirmed patient's thoughts, decisions, or attempts at behavior change, R - Reflected patient's change talk and S - Summarized patient's change talk statements    Assessment / Progress on Treatment Objective(s) / Homework:    New Objective established this session - CONTEMPLATION (Considering change and yet undecided); Intervened by assessing the negative and positive thinking (ambivalence) about behavior change         Plan: (Homework, other):  Patient was encouraged to continue to seek condition-related information and education, as well as schedule a follow up appointment with the Health  as needed. Patient has set self-identified goals and will monitor progress until the next appointment.  Next scheduled follow up is April 2018.      Sam Elam

## 2017-11-21 NOTE — MR AVS SNAPSHOT
After Visit Summary   11/21/2017    Hill Ardon    MRN: [id number]           Patient Information     Date Of Birth          1960        Visit Information        Provider Department      11/21/2017 12:00 PM HEALTH  - [facility]        Today's Diagnoses     Current smoker    -  1       Follow-ups after your visit        Your next 10 appointments already scheduled     Nov 27, 2017 10:00 AM CST   Telephone Visit with [facility]  [address] [place] ([facility])    [address]  [place] [id number] [phone]           Note: this is not an onsite visit; there is no need to come to the facility.              Who to contact     If you have questions or need follow up information about today's clinic visit or your schedule please contact [facility] directly at [phone].  Normal or non-critical lab and imaging results will be communicated to you by MyChart, letter or phone within 4 business days after the clinic has received the results. If you do not hear from us within 7 days, please contact the clinic through [email or web address] or phone. If you have a critical or abnormal lab result, we will notify you by phone as soon as possible.  Submit refill requests through [employer] or call your pharmacy and they will forward the refill request to us. Please allow 3 business days for your refill to be completed.          Additional Information About Your Visit        MyChart Information     [employer] gives you secure access to your electronic health record. If you see a primary care provider, you can also send messages to your care team and make appointments. If you have questions, please call your primary care clinic.  If you do not have a primary care provider, please call [phone] and they will assist you.        Care EveryWhere ID     This is your Care EveryWhere ID. This could be used by other organizations to access your  [place] medical records  [id number]         Blood Pressure from Last 3 Encounters:   09/14/17 124/72   02/23/17 133/84   01/31/17 104/76    Weight from Last 3 Encounters:   09/14/17 220 lb (99.8 kg)   02/23/17 235 lb 9.6 oz (106.9 kg)   01/31/17 240 lb (108.9 kg)              Today, you had the following     No orders found for display         Today's Medication Changes          These changes are accurate as of: 11/21/17  3:02 PM.  If you have any questions, ask your nurse or doctor.               These medicines have changed or have updated prescriptions.        Dose/Directions    traZODone 100 MG tablet   Commonly known as:  DESYREL   This may have changed:    - when to take this  - reasons to take this  - additional instructions   Used for:  Insomnia, unspecified type        Dose:  100 mg   Take 1 tablet (100 mg) by mouth At Bedtime At night   Quantity:  90 tablet   Refills:  3                Primary Care Provider Office Phone # Fax #    Fariba Ding [phone]       [address] [facility] [address]        Equal Access to Services     [facility]: Hadii judi graves [place], wabeckieda talib, qamodestota alaina mckenna, george rojas . So [facility] [phone].    ATENCIÓN: Si habla español, tiene a noble disposición servicios gratuitos de asistencia lingüística. [facility] [phone].    We comply with applicable federal civil rights laws and [place] laws. We do not discriminate on the basis of race, color, national origin, age, disability, sex, sexual orientation, or gender identity.            Thank you!     Thank you for choosing [facility]  for your care. Our goal is always to provide you with excellent care. Hearing back from our patients is one way we can continue to improve our services. Please take a few minutes to complete the written survey that you may receive in the mail after your visit with us. Thank you!             Your Updated  Medication List - Protect others around you: Learn how to safely use, store and throw away your medicines at www.disposemymeds.org.          This list is accurate as of: 11/21/17  3:02 PM.  Always use your most recent med list.                   Brand Name Dispense Instructions for use Diagnosis    albuterol 108 (90 BASE) MCG/ACT Inhaler    PROAIR HFA/PROVENTIL HFA/VENTOLIN HFA    3 Inhaler    Inhale 2 puffs into the lungs every 4 hours as needed for shortness of breath / dyspnea or wheezing q 4 hours as needed    Chronic obstructive pulmonary disease, unspecified COPD type (H)       aspirin 81 MG tablet      Take 81 mg by mouth daily        * atorvastatin 40 MG tablet    LIPITOR    30 tablet    Take 1 tablet (40 mg) by mouth daily    Hyperlipidemia LDL goal <100       * atorvastatin 40 MG tablet    LIPITOR    90 tablet    TAKE ONE TABLET BY MOUTH EVERY DAY    Hyperlipidemia LDL goal <100, Type 2 diabetes mellitus with other circulatory complication, with long-term current use of insulin (H)       blood glucose monitoring lancets      as needed Reported on 4/7/2017        blood glucose monitoring test strip    no brand specified    400 strip    Use to test blood sugars 4 times daily or as directed    Type 2 diabetes mellitus with other circulatory complications       cephALEXin 500 MG capsule    KEFLEX    30 capsule    Take 1 capsule (500 mg) by mouth 3 times daily    Local infection of skin and subcutaneous tissue       fenofibrate 145 MG tablet      Take 145 mg by mouth daily        GENTLE STOOL SOFTENER PO      Take 200 mg by mouth daily        HARVONI  MG per tablet   Generic drug:  ledipasvir-sofosbuvir           insulin aspart 100 UNIT/ML injection    NovoLOG FLEXPEN    15 mL    INJECT 10 UNITS SUBCUTANEOUSLY THREE TIMES DAILY WITH MEALS. (NEED TO BE SEEN IN CLINIC FOR FURTHER REFILLS) Refills at MD visit    Uncontrolled type 2 diabetes mellitus with other circulatory complication, with long-term current  use of insulin (H)       insulin pen needle 31G X 6 MM     100 each    Use 4 daily or as directed.    Type 2 diabetes mellitus with other circulatory complication, with long-term current use of insulin (H)       ipratropium - albuterol 0.5 mg/2.5 mg/3 mL 0.5-2.5 (3) MG/3ML neb solution    DUONEB    360 mL    Take 1 vial (3 mLs) by nebulization every 4 hours as needed for shortness of breath / dyspnea or wheezing    Chronic obstructive pulmonary disease, unspecified COPD type (H)       LANTUS SOLOSTAR 100 UNIT/ML injection   Generic drug:  insulin glargine     15 mL    INJECT 35 UNITS UNDER THE SKIN AT BEDTIME    Uncontrolled type 2 diabetes mellitus with other circulatory complication, with long-term current use of insulin (H)       lisinopril 40 MG tablet    PRINIVIL/ZESTRIL    90 tablet    Take 1 tablet (40 mg) by mouth daily    Essential hypertension with goal blood pressure less than 140/90       metFORMIN 500 MG 24 hr tablet    GLUCOPHAGE-XR    90 tablet    Take 1 tablet (500 mg) by mouth daily    Type 2 diabetes mellitus without complication, without long-term current use of insulin (H)       nitroGLYcerin 0.4 MG sublingual tablet    NITROSTAT     Place 0.4 mg under the tongue as needed        RA NICOTINE GUM MT           traZODone 100 MG tablet    DESYREL    90 tablet    Take 1 tablet (100 mg) by mouth At Bedtime At night    Insomnia, unspecified type       * Notice:  This list has 2 medication(s) that are the same as other medications prescribed for you. Read the directions carefully, and ask your doctor or other care provider to review them with you.

## 2017-11-29 ENCOUNTER — VIRTUAL VISIT (OUTPATIENT)
Dept: NURSING | Facility: CLINIC | Age: 57
End: 2017-11-29

## 2017-11-29 DIAGNOSIS — E66.01 MORBID OBESITY DUE TO EXCESS CALORIES (H): Primary | ICD-10-CM

## 2017-11-29 PROCEDURE — 99207 ZZC HEALTH COACHING, NO CHARGE: CPT

## 2017-11-29 NOTE — PROGRESS NOTES
November 29, 2017    [address]  [place] [address]  [phone]  Health Coaching Progress Note    Patient Name: Hill Ardon Date: November 29, 2017      Session Length: 20      DATA    PRM Master Survey Scores Reviewed: Yes    Core Healthy Days Survey:         JEFE Score (Last Two) 5/24/2017 10/2/2017   JEFE Raw Score 40 39   Activation Score 100 90.2   JEFE Level 4 4       PHQ-2 Score 10/2/2017 5/24/2017   PHQ-2 Total Score Interpretation - Positive if 3 or more points; Administer PHQ-9 if positive 0 0       No flowsheet data found.    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    What Patient Does Well:   Geoffrey mentions that the only thing going well for him right now is that he is still avoiding donuts and pop and still taking his medications.    Previous Successes:   No new successes today  Areas in Need of Improvement:   Geoffrey is continuing to work on avoiding pop and donuts and checking his blood sugar  Barriers to Change:   Stress with caring for grandson and job  Reasons for Change:   Geoffrey would like to change to prevent complications with his diabetes and also to be around for a long time to care for his grandson who he adopted  Plan/Goal for the Next 4 Weeks:   GOAL #1: Avoid pop and donuts  GOAL #1 Progress Toward Goal: 50%  GOAL #2: Check blood sugar 3x/day  GOAL #2 Progress Toward Goal: 50%  GOAL #3: Take medications as directed  GOAL #3 Progress Toward Goal: 50%    Intervention:  Motivational Interviewing    MI Intervention: Expressed Empathy/Understanding, Supported Autonomy, Collaboration, Evocation, Permission to raise concern or advise, Open-ended questions, Reflections: simple and complex, Change talk (evoked) and Reframe     Change Talk Expressed by the Patient: Desire to change  Ability to change Reasons to change Need to change Committment to change Activation Taking steps    Provider Response to Change Talk: E - Evoked more info from patient about behavior change, A - Affirmed patient's thoughts, decisions, or attempts at behavior change, R - Reflected patient's change talk and S - Summarized patient's change talk statements    Assessment / Progress on Treatment Objective(s) / Homework:  Minimal progress - ACTION (Actively working towards change); Intervened by reinforcing change plan / affirming steps taken         Plan: (Homework, other):  Patient was encouraged to continue to seek condition-related information and education, as well as schedule a follow up appointment with the Health  in 4 weeks. Patient has set self-identified goals and will monitor progress until the next appointment.  Follow-up scheduled for December 27th at 10:00 am.      Shira Diaz, JI, LD

## 2017-11-29 NOTE — MR AVS SNAPSHOT
After Visit Summary   11/29/2017    Hill Ardon    MRN: [id number]           Patient Information     Date Of Birth          1960        Visit Information        Provider Department      11/29/2017 10:00 AM [facility]  - [facility]        Today's Diagnoses     Morbid obesity due to excess calories (H)    -  1       Follow-ups after your visit        Your next 10 appointments already scheduled     Apr 05, 2018 12:00 PM CDT   Telephone Visit with HEALTH  - [facility] 2   [facility] ([facility])    [address] [facility]  [place] [address] [phone]           Note: this is not an onsite visit; there is no need to come to the facility.              Who to contact     If you have questions or need follow up information about today's clinic visit or your schedule please contact [facility] directly at [phone].  Normal or non-critical lab and imaging results will be communicated to you by [employer], letter or phone within 4 business days after the clinic has received the results. If you do not hear from us within 7 days, please contact the clinic through [employer] or phone. If you have a critical or abnormal lab result, we will notify you by phone as soon as possible.  Submit refill requests through [employer] or call your pharmacy and they will forward the refill request to us. Please allow 3 business days for your refill to be completed.          Additional Information About Your Visit        [employer] Information     [employer] gives you secure access to your electronic health record. If you see a primary care provider, you can also send messages to your care team and make appointments. If you have questions, please call your primary care clinic.  If you do not have a primary care provider, please call [phone] and they will assist you.        Care EveryWhere ID     This is your Care EveryWhere ID. This could be used by other  organizations to access your [place] medical records  [id number]         Blood Pressure from Last 3 Encounters:   09/14/17 124/72   02/23/17 133/84   01/31/17 104/76    Weight from Last 3 Encounters:   09/14/17 220 lb (99.8 kg)   02/23/17 235 lb 9.6 oz (106.9 kg)   01/31/17 240 lb (108.9 kg)              Today, you had the following     No orders found for display         Today's Medication Changes          These changes are accurate as of: 11/29/17 10:40 AM.  If you have any questions, ask your nurse or doctor.               These medicines have changed or have updated prescriptions.        Dose/Directions    traZODone 100 MG tablet   Commonly known as:  DESYREL   This may have changed:    - when to take this  - reasons to take this  - additional instructions   Used for:  Insomnia, unspecified type        Dose:  100 mg   Take 1 tablet (100 mg) by mouth At Bedtime At night   Quantity:  90 tablet   Refills:  3                Primary Care Provider Office Phone # Fax #    Fariba Ding [phone] [address] [facility] [address]        Equal Access to Services     [facility]: Hadii judi Negro, wabeckieda talib, qachristin mckenna, george rojas . So [facility] [place] [phone].    ATENCIÓN: Si habla español, tiene a noble disposición servicios gratuitos de asistencia lingüística. [facility] [phone].    We comply with applicable federal civil rights laws and [place] laws. We do not discriminate on the basis of race, color, national origin, age, disability, sex, sexual orientation, or gender identity.            Thank you!     Thank you for choosing [facility]  for your care. Our goal is always to provide you with excellent care. Hearing back from our patients is one way we can continue to improve our services. Please take a few minutes to complete the written survey that you may receive in the mail after your visit with us. Thank  you!             Your Updated Medication List - Protect others around you: Learn how to safely use, store and throw away your medicines at www.disposemymeds.org.          This list is accurate as of: 11/29/17 10:40 AM.  Always use your most recent med list.                   Brand Name Dispense Instructions for use Diagnosis    albuterol 108 (90 BASE) MCG/ACT Inhaler    PROAIR HFA/PROVENTIL HFA/VENTOLIN HFA    3 Inhaler    Inhale 2 puffs into the lungs every 4 hours as needed for shortness of breath / dyspnea or wheezing q 4 hours as needed    Chronic obstructive pulmonary disease, unspecified COPD type (H)       aspirin 81 MG tablet      Take 81 mg by mouth daily        * atorvastatin 40 MG tablet    LIPITOR    30 tablet    Take 1 tablet (40 mg) by mouth daily    Hyperlipidemia LDL goal <100       * atorvastatin 40 MG tablet    LIPITOR    90 tablet    TAKE ONE TABLET BY MOUTH EVERY DAY    Hyperlipidemia LDL goal <100, Type 2 diabetes mellitus with other circulatory complication, with long-term current use of insulin (H)       blood glucose monitoring lancets      as needed Reported on 4/7/2017        blood glucose monitoring test strip    no brand specified    400 strip    Use to test blood sugars 4 times daily or as directed    Type 2 diabetes mellitus with other circulatory complications       cephALEXin 500 MG capsule    KEFLEX    30 capsule    Take 1 capsule (500 mg) by mouth 3 times daily    Local infection of skin and subcutaneous tissue       fenofibrate 145 MG tablet      Take 145 mg by mouth daily        GENTLE STOOL SOFTENER PO      Take 200 mg by mouth daily        HARVONI  MG per tablet   Generic drug:  ledipasvir-sofosbuvir           insulin aspart 100 UNIT/ML injection    NovoLOG FLEXPEN    15 mL    INJECT 10 UNITS SUBCUTANEOUSLY THREE TIMES DAILY WITH MEALS. (NEED TO BE SEEN IN CLINIC FOR FURTHER REFILLS) Refills at MD visit    Uncontrolled type 2 diabetes mellitus with other circulatory  complication, with long-term current use of insulin (H)       insulin pen needle 31G X 6 MM     100 each    Use 4 daily or as directed.    Type 2 diabetes mellitus with other circulatory complication, with long-term current use of insulin (H)       ipratropium - albuterol 0.5 mg/2.5 mg/3 mL 0.5-2.5 (3) MG/3ML neb solution    DUONEB    360 mL    Take 1 vial (3 mLs) by nebulization every 4 hours as needed for shortness of breath / dyspnea or wheezing    Chronic obstructive pulmonary disease, unspecified COPD type (H)       LANTUS SOLOSTAR 100 UNIT/ML injection   Generic drug:  insulin glargine     15 mL    INJECT 35 UNITS UNDER THE SKIN AT BEDTIME    Uncontrolled type 2 diabetes mellitus with other circulatory complication, with long-term current use of insulin (H)       lisinopril 40 MG tablet    PRINIVIL/ZESTRIL    90 tablet    Take 1 tablet (40 mg) by mouth daily    Essential hypertension with goal blood pressure less than 140/90       metFORMIN 500 MG 24 hr tablet    GLUCOPHAGE-XR    90 tablet    Take 1 tablet (500 mg) by mouth daily    Type 2 diabetes mellitus without complication, without long-term current use of insulin (H)       nitroGLYcerin 0.4 MG sublingual tablet    NITROSTAT     Place 0.4 mg under the tongue as needed        RA NICOTINE GUM MT           traZODone 100 MG tablet    DESYREL    90 tablet    Take 1 tablet (100 mg) by mouth At Bedtime At night    Insomnia, unspecified type       * Notice:  This list has 2 medication(s) that are the same as other medications prescribed for you. Read the directions carefully, and ask your doctor or other care provider to review them with you.

## 2018-01-22 ENCOUNTER — TELEPHONE (OUTPATIENT)
Dept: FAMILY MEDICINE | Facility: CLINIC | Age: 58
End: 2018-01-22

## 2018-01-22 NOTE — TELEPHONE ENCOUNTER
Panel Management Review      Patient has the following on his problem list:     Diabetes    ASA: Passed    Last A1C  Lab Results   Component Value Date    A1C 10.7 09/14/2017    A1C 9.4 07/19/2017    A1C 10.2 02/23/2017    A1C 11.8 01/31/2017    A1C 9.9 08/24/2016     A1C tested: FAILED    Last LDL:    Lab Results   Component Value Date    CHOL 235 01/31/2017     Lab Results   Component Value Date    HDL 37 01/31/2017     Lab Results   Component Value Date     01/31/2017     Lab Results   Component Value Date    TRIG 343 01/31/2017     No results found for: CHOLHDLRATIO  Lab Results   Component Value Date    NHDL 198 01/31/2017       Is the patient on a Statin? YES             Is the patient on Aspirin? YES    Medications     HMG CoA Reductase Inhibitors    atorvastatin (LIPITOR) 40 MG tablet    atorvastatin (LIPITOR) 40 MG tablet    Salicylates    aspirin 81 MG tablet          Last three blood pressure readings:  BP Readings from Last 3 Encounters:   09/14/17 124/72   02/23/17 133/84   01/31/17 104/76       Date of last diabetes office visit: 05/24/2017     Tobacco History:     History   Smoking Status     Former Smoker     Types: Cigarettes     Quit date: 4/10/2017   Smokeless Tobacco     Never Used     Comment: quit cold turkey-some gum-regular and nicotine          COPD  Diagnosis date: 07/14/2011   Is this diagnosis new within the last year?   YES   Was spirometry completed?  NO          Composite cancer screening  Chart review shows that this patient is due/due soon for the following Colonoscopy  Summary:    Patient is due/failing the following:   A1C and COLONOSCOPY    Action needed:   Routed to provider for review.    Type of outreach:    Sent letter.    Questions for provider review:    None                                                                                                                                    Chevy Forman       Chart routed to Care Team

## 2018-01-22 NOTE — LETTER
January 22, 2018          Hill Ardon,  [address]  [facility] [address]        Dear Hill Ardon      Monitoring and managing your preventative and chronic health conditions are very important to us. Our records indicate that you have not scheduled for Colonoscopy, Fasting blood work, Diabetic Check and HgbA1C  which was recommended by Fariba Guerra.      If you have received your health care elsewhere, please call the clinic so the information can be documented in your chart.    Please call [phone] or message us through your [employer] account to schedule an appointment or provide information for your chart.     Feel free to contact us if you have any questions or concerns!    I look forward to seeing you and working with you on your health care needs.     Sincerely,         Fariba Ding / Chevy Forman

## 2018-01-25 ENCOUNTER — TELEPHONE (OUTPATIENT)
Dept: FAMILY MEDICINE | Facility: CLINIC | Age: 58
End: 2018-01-25

## 2018-04-19 ENCOUNTER — VIRTUAL VISIT (OUTPATIENT)
Dept: NURSING | Facility: CLINIC | Age: 58
End: 2018-04-19

## 2018-04-19 DIAGNOSIS — F17.200 CURRENT SMOKER: Primary | ICD-10-CM

## 2018-04-19 PROCEDURE — 99207 ZZC HEALTH COACHING, NO CHARGE: CPT

## 2018-04-19 NOTE — PROGRESS NOTES
April 19, 2018    [facility]  [address]  [place] [address]  [phone]  Health Coaching Progress Note    Patient Name: Hill Ardon Date: April 19, 2018      Session Length: 25      DATA    PRM Master Survey Scores Reviewed: Yes    Core Healthy Days Survey:         JEFE Score (Last Two) 5/24/2017 10/2/2017   JEFE Raw Score 40 39   Activation Score 100 90.2   JEFE Level 4 4       PHQ-2 Score 10/2/2017 5/24/2017   PHQ-2 Total Score Interpretation - Positive if 3 or more points; Administer PHQ-9 if positive 0 0       No flowsheet data found.    Treatment Objective(s) Addressed in This Session:  Target Behavior(s): smoking    Current Stressors / Issues:  Geoffrey reports that he is getting settled in to life again after the adoption of his grandson Anival.    What Patient Does Well:   Geoffrey shares that he has been working on his DM and that his BG levels have been good for the most part.  Previous Successes:   Geoffrey has been in good control of his BG levels.  Areas in Need of Improvement:   Geoffrey states that he still thinks about quitting smoking pretty regularly. Geoffrey says that writer may check in with him on this monthly for now as he continues to think about quitting. Writer agrees with this plan.  Barriers to Change:   Geoffrey says right now isn't quite the right time. Geoffrey notes stress with work at this time. Geoffrey states that if he could quit smoking when driving it would be a big win for him.  Reasons for Change:   Geoffrey states that he may get a new company vehicle soon and this could be motivating for him as he would like to keep it smoke free if so. Geoffrey would like to quit smoking for his health and also for financial reasons.  Plan/Goal for the Next 4 Weeks:     GOAL #1: Begin considering tobacco cessation-maybe in the next 90 days  GOAL #1 Progress Toward Goal: 0%    Intervention:  Motivational Interviewing    MI Intervention: Expressed  Empathy/Understanding, Supported Autonomy, Collaboration, Evocation, Permission to raise concern or advise, Open-ended questions, Reflections: simple and complex, Change talk (evoked) and Reframe     Change Talk Expressed by the Patient: Desire to change Reasons to change Committment to change Activation    Provider Response to Change Talk: E - Evoked more info from patient about behavior change, A - Affirmed patient's thoughts, decisions, or attempts at behavior change, R - Reflected patient's change talk and S - Summarized patient's change talk statements    Assessment / Progress on Treatment Objective(s) / Homework:    Minimal progress - PREPARATION (Decided to change - considering how); Intervened by negotiating a change plan and determining options / strategies for behavior change, identifying triggers, exploring social supports, and working towards setting a date to begin behavior change         Plan: (Homework, other):  Patient was encouraged to continue to seek condition-related information and education, as well as schedule a follow up appointment with the Health  in 4 weeks. Patient has set self-identified goals and will monitor progress until the next appointment.  Next visit scheduled for May 17 at 11AM.      Sam Elam

## 2018-04-19 NOTE — MR AVS SNAPSHOT
After Visit Summary   4/19/2018    Hill Ardon    MRN: [id number]           Patient Information     Date Of Birth          1960        Visit Information        Provider Department      4/19/2018 11:00 AM HEALTH  - REGION 3 [facility] [place]        Today's Diagnoses     Current smoker    -  1       Follow-ups after your visit        Who to contact     If you have questions or need follow up information about today's clinic visit or your schedule please contact [facility] [place] directly at [phone].  Normal or non-critical lab and imaging results will be communicated to you by [employer], letter or phone within 4 business days after the clinic has received the results. If you do not hear from us within 7 days, please contact the clinic through [employer] or phone. If you have a critical or abnormal lab result, we will notify you by phone as soon as possible.  Submit refill requests through [employer] or call your pharmacy and they will forward the refill request to us. Please allow 3 business days for your refill to be completed.          Additional Information About Your Visit        MyChart Information     Onit gives you secure access to your electronic health record. If you see a primary care provider, you can also send messages to your care team and make appointments. If you have questions, please call your primary care clinic.  If you do not have a primary care provider, please call [phone] and they will assist you.        Care EveryWhere ID     This is your Care EveryWhere ID. This could be used by other organizations to access your [place] medical records  [id number]         Blood Pressure from Last 3 Encounters:   09/14/17 124/72   02/23/17 133/84   01/31/17 104/76    Weight from Last 3 Encounters:   09/14/17 220 lb (99.8 kg)   02/23/17 235 lb 9.6 oz (106.9 kg)   01/31/17 240 lb (108.9 kg)              Today, you had the following     No orders found for display          Today's Medication Changes          These changes are accurate as of 4/19/18 11:59 PM.  If you have any questions, ask your nurse or doctor.               These medicines have changed or have updated prescriptions.        Dose/Directions    traZODone 100 MG tablet   Commonly known as:  DESYREL   This may have changed:    - when to take this  - reasons to take this  - additional instructions   Used for:  Insomnia, unspecified type        Dose:  100 mg   Take 1 tablet (100 mg) by mouth At Bedtime At night   Quantity:  90 tablet   Refills:  3                Primary Care Provider Office Phone # Fax #    Fariba Ding [phone] [address] [address]        Equal Access to Services     [facility] : Sydni Negro, alireza mayers, lokesh mckenna, george spain. So [facility] [phone].    ATENCIÓN: Si habla español, tiene a noble disposición servicios gratuitos de asistencia lingüística. Llame al [phone].    We comply with applicable federal civil rights laws and [place] laws. We do not discriminate on the basis of race, color, national origin, age, disability, sex, sexual orientation, or gender identity.            Thank you!     Thank you for choosing [facility]  for your care. Our goal is always to provide you with excellent care. Hearing back from our patients is one way we can continue to improve our services. Please take a few minutes to complete the written survey that you may receive in the mail after your visit with us. Thank you!             Your Updated Medication List - Protect others around you: Learn how to safely use, store and throw away your medicines at www.disposemymeds.org.          This list is accurate as of 4/19/18 11:59 PM.  Always use your most recent med list.                   Brand Name Dispense Instructions for use Diagnosis    albuterol 108 (90 Base) MCG/ACT Inhaler    PROAIR  HFA/PROVENTIL HFA/VENTOLIN HFA    3 Inhaler    Inhale 2 puffs into the lungs every 4 hours as needed for shortness of breath / dyspnea or wheezing q 4 hours as needed    Chronic obstructive pulmonary disease, unspecified COPD type (H)       aspirin 81 MG tablet      Take 81 mg by mouth daily        * atorvastatin 40 MG tablet    LIPITOR    30 tablet    Take 1 tablet (40 mg) by mouth daily    Hyperlipidemia LDL goal <100       * atorvastatin 40 MG tablet    LIPITOR    90 tablet    TAKE ONE TABLET BY MOUTH EVERY DAY    Hyperlipidemia LDL goal <100, Type 2 diabetes mellitus with other circulatory complication, with long-term current use of insulin (H)       blood glucose monitoring lancets      as needed Reported on 4/7/2017        blood glucose monitoring test strip    no brand specified    400 strip    Use to test blood sugars 4 times daily or as directed    Type 2 diabetes mellitus with other circulatory complications       cephALEXin 500 MG capsule    KEFLEX    30 capsule    Take 1 capsule (500 mg) by mouth 3 times daily    Local infection of skin and subcutaneous tissue       fenofibrate 145 MG tablet      Take 145 mg by mouth daily        GENTLE STOOL SOFTENER PO      Take 200 mg by mouth daily        HARVONI  MG per tablet   Generic drug:  ledipasvir-sofosbuvir           insulin aspart 100 UNIT/ML injection    NovoLOG FLEXPEN    15 mL    INJECT 10 UNITS SUBCUTANEOUSLY THREE TIMES DAILY WITH MEALS. (NEED TO BE SEEN IN CLINIC FOR FURTHER REFILLS) Refills at MD visit    Uncontrolled type 2 diabetes mellitus with other circulatory complication, with long-term current use of insulin (H)       insulin pen needle 31G X 6 MM     100 each    Use 4 daily or as directed.    Type 2 diabetes mellitus with other circulatory complication, with long-term current use of insulin (H)       ipratropium - albuterol 0.5 mg/2.5 mg/3 mL 0.5-2.5 (3) MG/3ML neb solution    DUONEB    360 mL    Take 1 vial (3 mLs) by nebulization  every 4 hours as needed for shortness of breath / dyspnea or wheezing    Chronic obstructive pulmonary disease, unspecified COPD type (H)       LANTUS SOLOSTAR 100 UNIT/ML injection   Generic drug:  insulin glargine     15 mL    INJECT 35 UNITS UNDER THE SKIN AT BEDTIME    Uncontrolled type 2 diabetes mellitus with other circulatory complication, with long-term current use of insulin (H)       lisinopril 40 MG tablet    PRINIVIL/ZESTRIL    90 tablet    Take 1 tablet (40 mg) by mouth daily    Essential hypertension with goal blood pressure less than 140/90       metFORMIN 500 MG 24 hr tablet    GLUCOPHAGE-XR    90 tablet    Take 1 tablet (500 mg) by mouth daily    Type 2 diabetes mellitus without complication, without long-term current use of insulin (H)       nitroGLYcerin 0.4 MG sublingual tablet    NITROSTAT     Place 0.4 mg under the tongue as needed        RA NICOTINE GUM MT           traZODone 100 MG tablet    DESYREL    90 tablet    Take 1 tablet (100 mg) by mouth At Bedtime At night    Insomnia, unspecified type       * Notice:  This list has 2 medication(s) that are the same as other medications prescribed for you. Read the directions carefully, and ask your doctor or other care provider to review them with you.

## 2018-04-23 ENCOUNTER — TELEPHONE (OUTPATIENT)
Dept: FAMILY MEDICINE | Facility: CLINIC | Age: 58
End: 2018-04-23

## 2018-04-23 NOTE — TELEPHONE ENCOUNTER
Panel Management Review      Patient has the following on his problem list:     Diabetes    ASA: Passed    Last A1C  Lab Results   Component Value Date    A1C 10.7 09/14/2017    A1C 9.4 07/19/2017    A1C 10.2 02/23/2017    A1C 11.8 01/31/2017    A1C 9.9 08/24/2016     A1C tested: FAILED    Last LDL:    Lab Results   Component Value Date    CHOL 235 01/31/2017     Lab Results   Component Value Date    HDL 37 01/31/2017     Lab Results   Component Value Date     01/31/2017     Lab Results   Component Value Date    TRIG 343 01/31/2017     No results found for: CHOLHDLRATIO  Lab Results   Component Value Date    NHDL 198 01/31/2017       Is the patient on a Statin? YES             Is the patient on Aspirin? YES    Medications     HMG CoA Reductase Inhibitors    atorvastatin (LIPITOR) 40 MG tablet    atorvastatin (LIPITOR) 40 MG tablet    Salicylates    aspirin 81 MG tablet          Last three blood pressure readings:  BP Readings from Last 3 Encounters:   09/14/17 124/72   02/23/17 133/84   01/31/17 104/76       Date of last diabetes office visit: 9/14/17     Tobacco History:     History   Smoking Status     Current Every Day Smoker     Types: Cigarettes     Last attempt to quit: 4/10/2017   Smokeless Tobacco     Never Used     Comment: 4/10/2017-last quit attempt-quit cold turkey-some gum-regular and nicotine          COPD  Diagnosis date: 7/14/11   Is this diagnosis new within the last year?   NO   Was spirometry completed?  NO      Composite cancer screening  Chart review shows that this patient is due/due soon for the following Colonoscopy  Summary:    Patient is due/failing the following:   A1C, COLONOSCOPY and LDL    Action needed:   Patient needs office visit for Diabetic Exam, Colonoscopy,     Type of outreach:Appointement sheduled for 5/14/2018 with Dr. DingAppointement sheduled for 5/14/2018 with Dr. Ding    Questions for provider review:    None                                                                                                                                     Reyna Liz MA

## 2018-05-21 ENCOUNTER — VIRTUAL VISIT (OUTPATIENT)
Dept: NURSING | Facility: CLINIC | Age: 58
End: 2018-05-21

## 2018-05-21 DIAGNOSIS — F17.200 CURRENT SMOKER: Primary | ICD-10-CM

## 2018-05-21 PROCEDURE — 99207 ZZC HEALTH COACHING, NO CHARGE: CPT

## 2018-05-21 NOTE — PROGRESS NOTES
May 21, 2018    [facility]  [address]  [place] [address]  [phone]  [phone]  Health Coaching Progress Note    Patient Name: Hill Ardon Date: May 21, 2018      Session Length: 25      DATA    PRM Master Survey Scores Reviewed: Yes    Core Healthy Days Survey:         JEFE Score (Last Two) 5/24/2017 10/2/2017   JEFE Raw Score 40 39   Activation Score 100 90.2   JEFE Level 4 4       PHQ-2 Score 10/2/2017 5/24/2017   PHQ-2 Total Score Interpretation - Positive if 3 or more points; Administer PHQ-9 if positive 0 0       No flowsheet data found.    Treatment Objective(s) Addressed in This Session:  Target Behavior(s): smoking    Current Stressors / Issues:  Geoffrey says that having a 3 year old (adopted grandson Anival) is very stressful.     What Patient Does Well:   Geoffrey shares that he was doing really well with managing his DM for quite a while but recently got off track again and is now working to get back on track with watching what he eats and resuming his weight loss efforts. Geoffrey adds that he plans to make a follow up appointment with his PCP soon.  Previous Successes:   Geoffrey also says he continues to think about quitting smoking and would still like to do this. Geoffrey is interested in following up with writer in another month to see if it is a good time to try again.  Areas in Need of Improvement:   Geoffrey is getting used to his new routine since he has Anival all of the time now and is working to manage the stress of it all the best that he can. Geoffrey is working to resume his healthy habits and plans to continue to work on this.  Barriers to Change:   Stress is the main Geoffrey's main barrier at this time. Geoffrey is working on this and as a result his sister-in-law has taken over managing his mom's care and Geoffrey is so grateful and says this will help him a lot so that he can do more of the things he needs to do for Anival and himself.  Reasons for  Change:   Geoffrey wants to be able to take good care of (raise) his grandson for a long time.  Plan/Goal for the Next 4 Weeks:     GOAL #1: Continue considering tobacco cessation-still considering but stress too high currently  GOAL #1 Progress Toward Goal: 25%    Intervention:  Motivational Interviewing    MI Intervention: Expressed Empathy/Understanding, Supported Autonomy, Collaboration, Evocation, Permission to raise concern or advise, Open-ended questions, Reflections: simple and complex, Change talk (evoked) and Reframe     Change Talk Expressed by the Patient: Desire to change Ability to change Reasons to change Committment to change Activation    Provider Response to Change Talk: E - Evoked more info from patient about behavior change, A - Affirmed patient's thoughts, decisions, or attempts at behavior change, R - Reflected patient's change talk and S - Summarized patient's change talk statements    Assessment / Progress on Treatment Objective(s) / Homework:    Minimal progress - PREPARATION (Decided to change - considering how); Intervened by negotiating a change plan and determining options / strategies for behavior change, identifying triggers, exploring social supports, and working towards setting a date to begin behavior change         Plan: (Homework, other):  Patient was encouraged to continue to seek condition-related information and education, as well as schedule a follow up appointment with the Health  in 4 weeks. Patient has set self-identified goals and will monitor progress until the next appointment.  Next visit scheduled for June 18 at 11AM.      Sam Elam

## 2018-05-21 NOTE — MR AVS SNAPSHOT
After Visit Summary   5/21/2018    Hill Ardon    MRN: [id number]           Patient Information     Date Of Birth          1960        Visit Information        Provider Department      5/21/2018 11:00 AM [facility]        Today's Diagnoses     Current smoker    -  1       Follow-ups after your visit        Follow-up notes from your care team     Return in 4 weeks (on 6/18/2018).      Who to contact     If you have questions or need follow up information about today's clinic visit or your schedule please contact [facility] directly at [phone].  Normal or non-critical lab and imaging results will be communicated to you by [employer], letter or phone within 4 business days after the clinic has received the results. If you do not hear from us within 7 days, please contact the clinic through [employer] or phone. If you have a critical or abnormal lab result, we will notify you by phone as soon as possible.  Submit refill requests through [employer] or call your pharmacy and they will forward the refill request to us. Please allow 3 business days for your refill to be completed.          Additional Information About Your Visit        MyChart Information     [employer] gives you secure access to your electronic health record. If you see a primary care provider, you can also send messages to your care team and make appointments. If you have questions, please call your primary care clinic.  If you do not have a primary care provider, please call [phone] and they will assist you.        Care EveryWhere ID     This is your Care EveryWhere ID. This could be used by other organizations to access your [place] medical records  [id number]         Blood Pressure from Last 3 Encounters:   09/14/17 124/72   02/23/17 133/84   01/31/17 104/76    Weight from Last 3 Encounters:   09/14/17 220 lb (99.8 kg)   02/23/17 235 lb 9.6 oz (106.9 kg)   01/31/17 240 lb (108.9  kg)              Today, you had the following     No orders found for display         Today's Medication Changes          These changes are accurate as of 5/21/18  1:01 PM.  If you have any questions, ask your nurse or doctor.               These medicines have changed or have updated prescriptions.        Dose/Directions    traZODone 100 MG tablet   Commonly known as:  DESYREL   This may have changed:    - when to take this  - reasons to take this  - additional instructions   Used for:  Insomnia, unspecified type        Dose:  100 mg   Take 1 tablet (100 mg) by mouth At Bedtime At night   Quantity:  90 tablet   Refills:  3                Primary Care Provider Office Phone # Fax #    Fariba Ding [phone]       [address] [address]        Equal Access to Services     [facility] : Sydni Negro, waaxda luqadaha, qaybta kaalmada adesageyadelfina, george rojas . So [facility] [phone].    ATENCIÓN: Si habla español, tiene a noble disposición servicios gratuitos de asistencia lingüística. [facility] [phone].    We comply with applicable federal civil rights laws and [place] laws. We do not discriminate on the basis of race, color, national origin, age, disability, sex, sexual orientation, or gender identity.            Thank you!     Thank you for choosing [facility]  for your care. Our goal is always to provide you with excellent care. Hearing back from our patients is one way we can continue to improve our services. Please take a few minutes to complete the written survey that you may receive in the mail after your visit with us. Thank you!             Your Updated Medication List - Protect others around you: Learn how to safely use, store and throw away your medicines at www.disposemymeds.org.          This list is accurate as of 5/21/18  1:01 PM.  Always use your most recent med list.                   Brand Name Dispense  Instructions for use Diagnosis    albuterol 108 (90 Base) MCG/ACT Inhaler    PROAIR HFA/PROVENTIL HFA/VENTOLIN HFA    3 Inhaler    Inhale 2 puffs into the lungs every 4 hours as needed for shortness of breath / dyspnea or wheezing q 4 hours as needed    Chronic obstructive pulmonary disease, unspecified COPD type (H)       aspirin 81 MG tablet      Take 81 mg by mouth daily        * atorvastatin 40 MG tablet    LIPITOR    30 tablet    Take 1 tablet (40 mg) by mouth daily    Hyperlipidemia LDL goal <100       * atorvastatin 40 MG tablet    LIPITOR    90 tablet    TAKE ONE TABLET BY MOUTH EVERY DAY    Hyperlipidemia LDL goal <100, Type 2 diabetes mellitus with other circulatory complication, with long-term current use of insulin (H)       blood glucose monitoring lancets      as needed Reported on 4/7/2017        blood glucose monitoring test strip    no brand specified    400 strip    Use to test blood sugars 4 times daily or as directed    Type 2 diabetes mellitus with other circulatory complications       cephALEXin 500 MG capsule    KEFLEX    30 capsule    Take 1 capsule (500 mg) by mouth 3 times daily    Local infection of skin and subcutaneous tissue       fenofibrate 145 MG tablet      Take 145 mg by mouth daily        GENTLE STOOL SOFTENER PO      Take 200 mg by mouth daily        HARVONI  MG per tablet   Generic drug:  ledipasvir-sofosbuvir           insulin aspart 100 UNIT/ML injection    NovoLOG FLEXPEN    15 mL    INJECT 10 UNITS SUBCUTANEOUSLY THREE TIMES DAILY WITH MEALS. (NEED TO BE SEEN IN CLINIC FOR FURTHER REFILLS) Refills at MD visit    Uncontrolled type 2 diabetes mellitus with other circulatory complication, with long-term current use of insulin (H)       insulin pen needle 31G X 6 MM     100 each    Use 4 daily or as directed.    Type 2 diabetes mellitus with other circulatory complication, with long-term current use of insulin (H)       ipratropium - albuterol 0.5 mg/2.5 mg/3 mL 0.5-2.5  (3) MG/3ML neb solution    DUONEB    360 mL    Take 1 vial (3 mLs) by nebulization every 4 hours as needed for shortness of breath / dyspnea or wheezing    Chronic obstructive pulmonary disease, unspecified COPD type (H)       LANTUS SOLOSTAR 100 UNIT/ML injection   Generic drug:  insulin glargine     15 mL    INJECT 35 UNITS UNDER THE SKIN AT BEDTIME    Uncontrolled type 2 diabetes mellitus with other circulatory complication, with long-term current use of insulin (H)       lisinopril 40 MG tablet    PRINIVIL/ZESTRIL    90 tablet    Take 1 tablet (40 mg) by mouth daily    Essential hypertension with goal blood pressure less than 140/90       metFORMIN 500 MG 24 hr tablet    GLUCOPHAGE-XR    90 tablet    Take 1 tablet (500 mg) by mouth daily    Type 2 diabetes mellitus without complication, without long-term current use of insulin (H)       nitroGLYcerin 0.4 MG sublingual tablet    NITROSTAT     Place 0.4 mg under the tongue as needed        RA NICOTINE GUM MT           traZODone 100 MG tablet    DESYREL    90 tablet    Take 1 tablet (100 mg) by mouth At Bedtime At night    Insomnia, unspecified type       * Notice:  This list has 2 medication(s) that are the same as other medications prescribed for you. Read the directions carefully, and ask your doctor or other care provider to review them with you.

## 2018-08-21 ENCOUNTER — TELEPHONE (OUTPATIENT)
Dept: FAMILY MEDICINE | Facility: CLINIC | Age: 58
End: 2018-08-21

## 2018-08-21 NOTE — TELEPHONE ENCOUNTER
Panel Management Review      Patient has the following on his problem list:     Diabetes    ASA: Passed    Last A1C  Lab Results   Component Value Date    A1C 10.7 09/14/2017    A1C 9.4 07/19/2017    A1C 10.2 02/23/2017    A1C 11.8 01/31/2017    A1C 9.9 08/24/2016     A1C tested: FAILED    Last LDL:    Lab Results   Component Value Date    CHOL 235 01/31/2017     Lab Results   Component Value Date    HDL 37 01/31/2017     Lab Results   Component Value Date     01/31/2017     Lab Results   Component Value Date    TRIG 343 01/31/2017     No results found for: CHOLHDLRATIO  Lab Results   Component Value Date    NHDL 198 01/31/2017       Is the patient on a Statin? YES             Is the patient on Aspirin? YES    Medications     HMG CoA Reductase Inhibitors    atorvastatin (LIPITOR) 40 MG tablet    atorvastatin (LIPITOR) 40 MG tablet    Salicylates    aspirin 81 MG tablet          Last three blood pressure readings:  BP Readings from Last 3 Encounters:   09/14/17 124/72   02/23/17 133/84   01/31/17 104/76       Date of last diabetes office visit: 09/27/2017     Tobacco History:     History   Smoking Status     Current Every Day Smoker     Types: Cigarettes     Last attempt to quit: 4/10/2017   Smokeless Tobacco     Never Used     Comment: 4/10/2017-last quit attempt-quit cold turkey-some gum-regular and nicotine         Hypertension   Last three blood pressure readings:  BP Readings from Last 3 Encounters:   09/14/17 124/72   02/23/17 133/84   01/31/17 104/76     Blood pressure: Passed    HTN Guidelines:  Age 18-59 BP range:  Less than 140/90  Age 60-85 with Diabetes:  Less than 140/90  Age 60-85 without Diabetes:  less than 150/90      Composite cancer screening  Chart review shows that this patient is due/due soon for the following Colonoscopy  Summary:    Patient is due/failing the following:   A1C, BP CHECK, COLONOSCOPY and LDL    Action needed:   Patient needs office visit for diabetes check,  physical.    Type of outreach:    Sent [employer] message.    Questions for provider review:    None                                                                                                                                    Ruthy GONZALEZ CMA ([facility])       Chart routed to none   .

## 2018-11-09 ENCOUNTER — DOCUMENTATION ONLY (OUTPATIENT)
Dept: LAB | Facility: CLINIC | Age: 58
End: 2018-11-09

## 2018-11-09 NOTE — LETTER
[address] [address]  [phone]          November 14, 2018    Hill Ardon                                                                                                                     [address]  [facility] [address]            Dear Hill,    We have tried to reach you by phone, but have been unable to do so.    We are sending you this letter to let you know that you are due for a diabetic check and some labs.    Please call [phone], at your earliest convenience to schedule this appointment.  Feel free to contact myself or my nurse with any questions or concerns.  Thank you.    Sincerely,         Fariba Ding MD/nabil

## 2018-11-09 NOTE — PROGRESS NOTES
This patient has overdue labs. A letter was sent on 10/4/2018 and there has been no lab appointment made. If you still want these labs done, please have your care team contact the patient to make a lab appointment. Otherwise, please have the labs discontinued and close the encounter.    Thank you,  [facility]

## 2020-02-23 ENCOUNTER — HEALTH MAINTENANCE LETTER (OUTPATIENT)
Age: 60
End: 2020-02-23

## 2020-12-12 ENCOUNTER — HEALTH MAINTENANCE LETTER (OUTPATIENT)
Age: 60
End: 2020-12-12

## 2021-04-11 ENCOUNTER — HEALTH MAINTENANCE LETTER (OUTPATIENT)
Age: 61
End: 2021-04-11

## 2021-08-01 ENCOUNTER — HEALTH MAINTENANCE LETTER (OUTPATIENT)
Age: 61
End: 2021-08-01

## 2021-09-26 ENCOUNTER — HEALTH MAINTENANCE LETTER (OUTPATIENT)
Age: 61
End: 2021-09-26

## 2021-11-21 ENCOUNTER — HEALTH MAINTENANCE LETTER (OUTPATIENT)
Age: 61
End: 2021-11-21

## 2022-03-13 ENCOUNTER — HEALTH MAINTENANCE LETTER (OUTPATIENT)
Age: 62
End: 2022-03-13

## 2022-05-08 ENCOUNTER — HEALTH MAINTENANCE LETTER (OUTPATIENT)
Age: 62
End: 2022-05-08

## 2022-07-03 ENCOUNTER — HEALTH MAINTENANCE LETTER (OUTPATIENT)
Age: 62
End: 2022-07-03

## 2023-01-08 ENCOUNTER — HEALTH MAINTENANCE LETTER (OUTPATIENT)
Age: 63
End: 2023-01-08

## 2023-02-26 NOTE — PATIENT INSTRUCTIONS
Recommendations from today's MTM visit:                                                    MTM (medication therapy management) is a service provided by a clinical pharmacist designed to help you get the most of out of your medicines.   Today we reviewed what your medicines are for, how to know if they are working, that your medicines are safe and how to make your medicine regimen as easy as possible.     1. Your quit date is set for 2/4. Use your Nicotine gum every 1-2 hours as needed for cravings during this time. The night before your quit date remember to throw out any smoking paraphernalia. Good luck, you can do it!    2. Hold Omeprazole while on Harvoni. Sometimes quitting this medication cold turkey can cause rebound heartburn. If you have this issue, please let me know and we will work out a plan to control your heartburn. For now avoid your food triggers. Omeprazole can block the absorption of Harvoni and should be avoided.    3. Monitor for thigh muscle weakness or achiness. This could be caused by a drug interaction between Atorvastatin and Harvoni. Let me know if this occurs.     4. Monitor for dizziness, lightheadedness, or excess fatigue. This could be caused by a drug interaction between Carvedilol and Harvoni. Let me know if this occurs.     5. Record your blood sugars and blood pressure in a journal between now and 2/8, our next visit. We will go over these specific numbers at our next visit.    6.  you medications from the pharmacy ASAP!    Concerning Harvoni Therapy:   -You will be taking Harvoni, 1 tablet daily in the morning for 12 weeks   -Do NOT take heartburn/reflux medications while on Harvoni without discussing with us first   -If you start any new medications, please call to discuss drug interactions with me before starting   -If you miss a dose, and it has been less than 12 hours, you may take the dose. If it has been more, skip the dose and take your next dose as normal. Do not  take 2 doses at the same time.    -Most common side effects are Headache and Fatigue. You may take it in the evening if these occur.     Next MTM visit: 2/8 at 2:30PM over the phone    To schedule another MTM appointment, please call the clinic directly or you may call the MTM scheduling line at [phone] or toll-free at [phone].     My Clinical Pharmacist's contact information:                                                      It was a pleasure seeing you today!  Please feel free to contact me with any questions or concerns you have.      Jose Healy, PharmD  MTM Pharmacist    Phone: [phone]     You may receive a survey about the MTM services you received.  I would appreciate your feedback to help me serve you better in the future. Please fill it out and return it when you can. Your comments will be anonymous.         (E4) spontaneous

## 2023-04-23 ENCOUNTER — HEALTH MAINTENANCE LETTER (OUTPATIENT)
Age: 63
End: 2023-04-23

## 2023-06-02 ENCOUNTER — HEALTH MAINTENANCE LETTER (OUTPATIENT)
Age: 63
End: 2023-06-02

## 2023-09-14 NOTE — MR AVS SNAPSHOT
After Visit Summary   4/28/2017    Hill Ardon    MRN: [id number]           Patient Information     Date Of Birth          1960        Visit Information        Provider Department      4/28/2017 3:00 PM HEALTH  - [facility]        Today's Diagnoses     Morbid obesity due to excess calories (H)    -  1    Type 2 diabetes mellitus with other circulatory complications (H)           Follow-ups after your visit        Your next 10 appointments already scheduled     May 26, 2017  9:00 AM CDT   [facility] ([facility]    [address] [place] [id number] [phone]           Note: this is not an onsite visit; there is no need to come to the facility.            Jul 19, 2017 10:00 AM CDT   LAB with  LAB   [facility] ([facility])    [address] [place] [id number] [phone]           Patient must bring picture ID.  Patient should be prepared to give a urine specimen  Please do not eat 10-12 hours before your appointment if you are coming in fasting for labs on lipids, cholesterol, or glucose (sugar).  Pregnant women should follow their Care Team instructions. Water with medications is okay. Do not drink coffee or other fluids.   If you have concerns about taking  your medications, please ask at office or if scheduling via [employer], send a message by clicking on Secure Messaging, Message Your Care Team.              Who to contact     If you have questions or need follow up information about today's clinic visit or your schedule please contact [facility] directly at [phone].  Normal or non-critical lab and imaging results will be communicated to you by MyChart, letter or phone within 4 business days after the clinic has received the results. If you do not hear from us  within 7 days, please contact the clinic through slinkset or phone. If you have a critical or abnormal lab result, we will notify you by phone as soon as possible.  Submit refill requests through slinkset or call your pharmacy and they will forward the refill request to us. Please allow 3 business days for your refill to be completed.          Additional Information About Your Visit        [employer] Information     [employer]inkset gives you secure access to your electronic health record. If you see a primary care provider, you can also send messages to your care team and make appointments. If you have questions, please call your primary care clinic.  If you do not have a primary care provider, please call [phone] and they will assist you.        Care EveryWhere ID     This is your Care EveryWhere ID. This could be used by other organizations to access your [place] medical records  [id number]         Blood Pressure from Last 3 Encounters:   02/23/17 133/84   01/31/17 104/76   10/19/16 116/63    Weight from Last 3 Encounters:   02/23/17 235 lb 9.6 oz (106.9 kg)   01/31/17 240 lb (108.9 kg)   10/19/16 246 lb (111.6 kg)              Today, you had the following     No orders found for display         Today's Medication Changes          These changes are accurate as of: 4/28/17 11:59 PM.  If you have any questions, ask your nurse or doctor.               These medicines have changed or have updated prescriptions.        Dose/Directions    insulin aspart 100 UNIT/ML injection   Commonly known as:  NovoLOG FLEXPEN   This may have changed:    - how much to take  - additional instructions   Used for:  Type 2 diabetes mellitus with other circulatory complication, with long-term current use of insulin (H)        INJECT 10 UNITS SUBCUTANEOUSLY THREE TIMES DAILY WITH MEALS. (NEED TO BE SEEN IN CLINIC FOR FURTHER REFILLS) Refills at MD visit   Quantity:  15 mL   Refills:  0       traZODone 100 MG tablet   Commonly known as:  DESYREL    This may have changed:    - when to take this  - reasons to take this  - additional instructions   Used for:  Insomnia, unspecified type        Dose:  100 mg   Take 1 tablet (100 mg) by mouth At Bedtime At night   Quantity:  90 tablet   Refills:  3                Primary Care Provider Office Phone # Fax #    Fariba Dign [phone]       [address] [address]        Thank you!     Thank you for choosing [facility]  for your care. Our goal is always to provide you with excellent care. Hearing back from our patients is one way we can continue to improve our services. Please take a few minutes to complete the written survey that you may receive in the mail after your visit with us. Thank you!             Your Updated Medication List - Protect others around you: Learn how to safely use, store and throw away your medicines at www.disposemymeds.org.          This list is accurate as of: 4/28/17 11:59 PM.  Always use your most recent med list.                   Brand Name Dispense Instructions for use    albuterol 108 (90 BASE) MCG/ACT Inhaler    PROAIR HFA/PROVENTIL HFA/VENTOLIN HFA    3 Inhaler    Inhale 2 puffs into the lungs every 4 hours as needed for shortness of breath / dyspnea or wheezing q 4 hours as needed       aspirin 81 MG tablet      Take 81 mg by mouth daily       atorvastatin 40 MG tablet    LIPITOR    30 tablet    Take 1 tablet (40 mg) by mouth daily       blood glucose monitoring lancets      as needed Reported on 4/7/2017       clopidogrel 75 MG tablet    PLAVIX     Take 75 mg by mouth daily       fenofibrate 145 MG tablet      Take 145 mg by mouth daily       GENTLE STOOL SOFTENER PO      Take 200 mg by mouth daily       insulin aspart 100 UNIT/ML injection    NovoLOG FLEXPEN    15 mL    INJECT 10 UNITS SUBCUTANEOUSLY THREE TIMES DAILY WITH MEALS. (NEED TO BE SEEN IN CLINIC FOR FURTHER REFILLS) Refills at MD visit        insulin glargine 100 UNIT/ML injection    LANTUS SOLOSTAR    10 mL    INJECT 35 UNITS SUBCUTANEOUSLY AT BEDTIME       insulin pen needle 31G X 6 MM     100 each    Use 4 daily or as directed.       ipratropium - albuterol 0.5 mg/2.5 mg/3 mL 0.5-2.5 (3) MG/3ML neb solution    DUONEB    360 mL    Take 1 vial (3 mLs) by nebulization every 4 hours as needed for shortness of breath / dyspnea or wheezing       ledipasvir-sofosbuvir  MG per tablet   Generic drug:  ledipasvir-sofosbuvir          lisinopril 40 MG tablet    PRINIVIL/ZESTRIL    90 tablet    Take 1 tablet (40 mg) by mouth daily       metFORMIN 500 MG 24 hr tablet    GLUCOPHAGE-XR    90 tablet    Take 1 tablet (500 mg) by mouth daily       nitroglycerin 0.4 MG sublingual tablet    NITROSTAT     Place 0.4 mg under the tongue as needed       RA NICOTINE GUM MT          traZODone 100 MG tablet    DESYREL    90 tablet    Take 1 tablet (100 mg) by mouth At Bedtime At night          Quinolones Counseling:  I discussed with the patient the risks of fluoroquinolones including but not limited to GI upset, allergic reaction, drug rash, diarrhea, dizziness, photosensitivity, yeast infections, liver function test abnormalities, tendonitis/tendon rupture.

## 2023-09-24 ENCOUNTER — HEALTH MAINTENANCE LETTER (OUTPATIENT)
Age: 63
End: 2023-09-24

## 2024-02-10 ENCOUNTER — HEALTH MAINTENANCE LETTER (OUTPATIENT)
Age: 64
End: 2024-02-10